# Patient Record
Sex: MALE | Race: BLACK OR AFRICAN AMERICAN | NOT HISPANIC OR LATINO | Employment: FULL TIME | URBAN - METROPOLITAN AREA
[De-identification: names, ages, dates, MRNs, and addresses within clinical notes are randomized per-mention and may not be internally consistent; named-entity substitution may affect disease eponyms.]

---

## 2017-02-01 ENCOUNTER — OFFICE VISIT (OUTPATIENT)
Dept: FAMILY MEDICINE | Facility: CLINIC | Age: 37
End: 2017-02-01
Payer: COMMERCIAL

## 2017-02-01 VITALS
OXYGEN SATURATION: 98 % | WEIGHT: 243.38 LBS | SYSTOLIC BLOOD PRESSURE: 140 MMHG | HEART RATE: 73 BPM | TEMPERATURE: 99 F | HEIGHT: 72 IN | BODY MASS INDEX: 32.97 KG/M2 | DIASTOLIC BLOOD PRESSURE: 90 MMHG

## 2017-02-01 DIAGNOSIS — M25.561 CHRONIC PAIN OF RIGHT KNEE: Primary | ICD-10-CM

## 2017-02-01 DIAGNOSIS — G89.29 CHRONIC PAIN OF RIGHT KNEE: Primary | ICD-10-CM

## 2017-02-01 DIAGNOSIS — J31.0 CHRONIC RHINITIS: ICD-10-CM

## 2017-02-01 PROCEDURE — 99999 PR PBB SHADOW E&M-EST. PATIENT-LVL III: CPT | Mod: PBBFAC,,, | Performed by: FAMILY MEDICINE

## 2017-02-01 PROCEDURE — 99214 OFFICE O/P EST MOD 30 MIN: CPT | Mod: S$GLB,,, | Performed by: FAMILY MEDICINE

## 2017-02-01 RX ORDER — BUTALBITAL, ACETAMINOPHEN AND CAFFEINE 50; 325; 40 MG/1; MG/1; MG/1
1 TABLET ORAL EVERY 6 HOURS PRN
Qty: 30 TABLET | Refills: 5 | Status: SHIPPED | OUTPATIENT
Start: 2017-02-01

## 2017-02-01 RX ORDER — NAPROXEN 500 MG/1
500 TABLET ORAL 2 TIMES DAILY WITH MEALS
Qty: 60 TABLET | Refills: 0 | Status: SHIPPED | OUTPATIENT
Start: 2017-02-01 | End: 2017-03-17 | Stop reason: SDUPTHER

## 2017-02-01 RX ORDER — TRAMADOL HYDROCHLORIDE 50 MG/1
50 TABLET ORAL EVERY 12 HOURS PRN
Qty: 60 TABLET | Refills: 0 | Status: SHIPPED | OUTPATIENT
Start: 2017-02-01 | End: 2017-03-17 | Stop reason: SDUPTHER

## 2017-02-01 RX ORDER — FLUTICASONE PROPIONATE 50 MCG
1 SPRAY, SUSPENSION (ML) NASAL DAILY
Qty: 16 G | Refills: 11 | Status: SHIPPED | OUTPATIENT
Start: 2017-02-01 | End: 2017-02-07 | Stop reason: SDUPTHER

## 2017-02-01 RX ORDER — CYCLOBENZAPRINE HCL 5 MG
TABLET ORAL
Refills: 1 | COMMUNITY
Start: 2016-12-27

## 2017-02-01 NOTE — PROGRESS NOTES
Subjective:       Patient ID: Leticia Cade is a 36 y.o. male.    Chief Complaint: Follow Up/ Knee Pain, Swelling and Discuss Pain Meds    HPI Comments: Subjective:     Leticia Cade is a 36 y.o. male who presents for evaluation of symptoms of a URI. Symptoms include achiness, coryza, cough described as productive and productive of green sputum and low grade fever. Onset of symptoms was 4 days ago, and has been gradually improving since that time. Treatment to date: theraflu.      Subjective:    Leticia Cade is a 36 y.o. male who presents with knee pain involving the right knee. Onset was gradual, starting about 9 months ago. Inciting event: injured while playing basketball. He dove ont he ground and his knee hit the wood floor. Current symptoms include: crepitus sensation, giving out, popping sensation, stiffness and swelling. Pain is aggravated by any weight bearing, lateral movements, walking and step climbing. Patient has had no prior knee problems. Evaluation to date: none. Treatment to date: meloxicam, diclofenac,ibuprofen, and steroid packs which are not very effective.      Review of Systems    Objective:       Vitals:    02/01/17 1137   BP: (!) 140/90   Pulse: 73   Temp: 98.6 °F (37 °C)   TempSrc: Oral   SpO2: 98%   Weight: 110.4 kg (243 lb 6.2 oz)   Height: 6' (1.829 m)       Physical Exam   Constitutional: He is oriented to person, place, and time. He appears well-developed and well-nourished. No distress.   HENT:   Head: Normocephalic and atraumatic.   Right Ear: External ear normal.   Left Ear: External ear normal.   Mouth/Throat: Oropharynx is clear and moist. No oropharyngeal exudate.   Neck: Normal range of motion. Neck supple.   Cardiovascular: Normal rate, regular rhythm and normal heart sounds.  Exam reveals no gallop and no friction rub.    No murmur heard.  Pulmonary/Chest: Effort normal and breath sounds normal. No respiratory distress. He has no wheezes. He has no  rales. He exhibits no tenderness.   Musculoskeletal:        Right knee: He exhibits decreased range of motion and swelling. He exhibits no effusion, no erythema, normal alignment and no LCL laxity. Tenderness found.        Legs:  Lymphadenopathy:     He has cervical adenopathy.   Neurological: He is alert and oriented to person, place, and time.   Skin: He is not diaphoretic.       Assessment:       1. Chronic pain of right knee    2. Chronic rhinitis        Plan:       Leticia Gonzales was seen today for follow up/ knee pain, swelling and discuss pain meds.    Diagnoses and all orders for this visit:    Chronic pain of right knee  -     X-Ray Knee 3 View Right; Future  -     MRI Lower Extremity Joint W WO Contrast Right; Future  -     Ambulatory referral to Orthopedics  -     naproxen (NAPROSYN) 500 MG tablet; Take 1 tablet (500 mg total) by mouth 2 (two) times daily with meals.  -     tramadol (ULTRAM) 50 mg tablet; Take 1 tablet (50 mg total) by mouth every 12 (twelve) hours as needed for Pain.  Given naproxen and ultRam for pain. Checking with xray and MRI FOR TENDINITIS AND/OR MENISCAL TEARS    Chronic rhinitis  -     fluticasone (FLONASE) 50 mcg/actuation nasal spray; 1 spray by Each Nare route once daily.    Other orders  -     butalbital-acetaminophen-caffeine -40 mg (FIORICET, ESGIC) -40 mg per tablet; Take 1 tablet by mouth every 6 (six) hours as needed.         `

## 2017-02-02 ENCOUNTER — HOSPITAL ENCOUNTER (OUTPATIENT)
Dept: RADIOLOGY | Facility: HOSPITAL | Age: 37
Discharge: HOME | End: 2017-02-02
Attending: FAMILY MEDICINE
Payer: COMMERCIAL

## 2017-02-02 DIAGNOSIS — G89.29 CHRONIC PAIN OF RIGHT KNEE: ICD-10-CM

## 2017-02-02 DIAGNOSIS — M25.561 CHRONIC PAIN OF RIGHT KNEE: ICD-10-CM

## 2017-02-02 PROCEDURE — 73721 MRI JNT OF LWR EXTRE W/O DYE: CPT | Mod: 26,RT,, | Performed by: RADIOLOGY

## 2017-02-02 PROCEDURE — 73721 MRI JNT OF LWR EXTRE W/O DYE: CPT | Mod: TC,RT

## 2017-02-02 PROCEDURE — 73562 X-RAY EXAM OF KNEE 3: CPT | Mod: TC,RT

## 2017-02-02 PROCEDURE — 73562 X-RAY EXAM OF KNEE 3: CPT | Mod: 26,RT,, | Performed by: RADIOLOGY

## 2017-02-03 ENCOUNTER — TELEPHONE (OUTPATIENT)
Dept: FAMILY MEDICINE | Facility: CLINIC | Age: 37
End: 2017-02-03

## 2017-02-03 NOTE — TELEPHONE ENCOUNTER
LET HIM KNOW HE HAS SOME INFLAMMATION OF HIS PATELLAR TENDON THAT I SUSPECTED, BUT NO COMPLETE TEARS. HE ALSO HAS DAMAGE TO THE CARTILAGE ALONG THE INNER PART OF HIS KNEE. IT DOESN'T LOOKS SURGICAL, BUT HE LIKELY WILL BENEFIT FROM INJECTIONS WITH THE ORTHOPEDIST. THAT REFERRAL HAS BEEN PLACED.

## 2017-02-03 NOTE — TELEPHONE ENCOUNTER
----- Message from Melissa Garza sent at 2/3/2017  2:28 PM CST -----  Contact: self  [phone]  Pt is requesting to speak to you regarding his pain and that he could not see the othro doctor because of the price. Pls call pt [phone]. Thanks......Maritza

## 2017-02-03 NOTE — TELEPHONE ENCOUNTER
Patient stated that issue with deductible was clarified with patient's insurance.  Stated he was able to see provider.

## 2017-02-07 DIAGNOSIS — J31.0 CHRONIC RHINITIS: ICD-10-CM

## 2017-02-07 RX ORDER — FLUTICASONE PROPIONATE 50 MCG
1 SPRAY, SUSPENSION (ML) NASAL DAILY
Qty: 16 G | Refills: 5 | Status: SHIPPED | OUTPATIENT
Start: 2017-02-07

## 2017-03-17 DIAGNOSIS — G89.29 CHRONIC PAIN OF RIGHT KNEE: ICD-10-CM

## 2017-03-17 DIAGNOSIS — M25.561 CHRONIC PAIN OF RIGHT KNEE: ICD-10-CM

## 2017-03-17 RX ORDER — TRAMADOL HYDROCHLORIDE 50 MG/1
50 TABLET ORAL EVERY 12 HOURS PRN
Qty: 60 TABLET | Refills: 0 | Status: SHIPPED | OUTPATIENT
Start: 2017-03-17

## 2017-03-17 RX ORDER — NAPROXEN 500 MG/1
500 TABLET ORAL 2 TIMES DAILY WITH MEALS
Qty: 60 TABLET | Refills: 0 | Status: SHIPPED | OUTPATIENT
Start: 2017-03-17

## 2017-03-17 NOTE — TELEPHONE ENCOUNTER
Returned call. Patient stated that his Orthopedic doctor cancelled his appt for today and r/s him for 3/24/17. Patient wanted to know if he could get a temporary supply of meds until appt. Advised that I would send message to MD to authorize refills. Informed that I would call back to let him know if meds were sent. Verbalized understanding.  Send to [facility] in [place], [place] Pharmacy was changed in chart.

## 2017-03-17 NOTE — TELEPHONE ENCOUNTER
----- Message from Angelica Mcqueen sent at 3/17/2017  8:15 AM CDT -----  Patient is requesting a 1 week supply pn below medications:      naproxen (NAPROSYN) 500 MG tablet   tramadol (ULTRAM) 50 mg tablet     His orthopedic canceled his appt this week. Patient believes he re-injured his leg. Please call patient at [phone] Thank you!

## 2025-05-19 ENCOUNTER — OFFICE VISIT (OUTPATIENT)
Dept: URGENT CARE | Facility: CLINIC | Age: 45
End: 2025-05-19
Payer: COMMERCIAL

## 2025-05-19 VITALS
OXYGEN SATURATION: 98 % | RESPIRATION RATE: 20 BRPM | HEIGHT: 72 IN | BODY MASS INDEX: 32.85 KG/M2 | HEART RATE: 99 BPM | WEIGHT: 242.5 LBS | TEMPERATURE: 100 F | SYSTOLIC BLOOD PRESSURE: 126 MMHG | DIASTOLIC BLOOD PRESSURE: 88 MMHG

## 2025-05-19 DIAGNOSIS — R49.0 HOARSENESS OF VOICE: ICD-10-CM

## 2025-05-19 DIAGNOSIS — R05.9 COUGH, UNSPECIFIED TYPE: ICD-10-CM

## 2025-05-19 DIAGNOSIS — J32.9 BACTERIAL SINUSITIS: Primary | ICD-10-CM

## 2025-05-19 DIAGNOSIS — B96.89 BACTERIAL SINUSITIS: Primary | ICD-10-CM

## 2025-05-19 LAB
CTP QC/QA: YES
CTP QC/QA: YES
POC MOLECULAR INFLUENZA A AGN: NEGATIVE
POC MOLECULAR INFLUENZA B AGN: NEGATIVE
SARS-COV+SARS-COV-2 AG RESP QL IA.RAPID: NEGATIVE

## 2025-05-19 PROCEDURE — 87811 SARS-COV-2 COVID19 W/OPTIC: CPT | Mod: QW,S$GLB,, | Performed by: NURSE PRACTITIONER

## 2025-05-19 PROCEDURE — 99204 OFFICE O/P NEW MOD 45 MIN: CPT | Mod: S$GLB,,, | Performed by: NURSE PRACTITIONER

## 2025-05-19 PROCEDURE — 87502 INFLUENZA DNA AMP PROBE: CPT | Mod: QW,S$GLB,, | Performed by: NURSE PRACTITIONER

## 2025-05-19 RX ORDER — AMOXICILLIN AND CLAVULANATE POTASSIUM 875; 125 MG/1; MG/1
1 TABLET, FILM COATED ORAL 2 TIMES DAILY
Qty: 14 TABLET | Refills: 0 | Status: SHIPPED | OUTPATIENT
Start: 2025-05-19 | End: 2025-05-26

## 2025-05-19 NOTE — LETTER
May 19, 2025      [facility] [address]  [place] [address]  Phone: [phone]  Fax: [phone]       Patient: Leticia Cade   YOB: 1980  Date of Visit: 05/19/2025    To Whom It May Concern:    Cheryl Cade  was at [facility] on 05/19/2025. The patient may return to work/school on 5/21/2025 with no restrictions. If you have any questions or concerns, or if I can be of further assistance, please do not hesitate to contact me.    Sincerely,      Madeline Win, NP

## 2025-05-19 NOTE — PATIENT INSTRUCTIONS
Prescriptions sent to pharmacy:  Augmentin- antibiotic- take twice a day for 7 days. Take with food. May take a probiotic to help with GI side effects.    Other recommendations:  Oral antihistamine (Claritin, Zyrtec, Allegra,or Xyzal) for post nasal drip and runny nose  Steroid nasal spray (Flonase) for runny nose and sinus congestion  Cough expectorant (Mucinex) to break up mucus in your chest. Take with a full glass of water    Please drink plenty of fluids.  Please get plenty of rest.  Nasal irrigation with a saline spray or Netti Pot like device per their directions is also recommended.  If you  smoke, please stop smoking.    If not allergic, take Tylenol (Acetaminophen) 650 mg to  1 g every 6 hours as needed  and/or Motrin (Ibuprofen) 600 to 800 mg every 6 hours as needed for fever or pain.    Please remember that you have received care at an urgent care today. Urgent cares are not emergency rooms and are not equipped to handle life threatening emergencies and cannot rule in or out certain medical conditions and you may be released before all of your medical problems are known or treated.     Please arrange follow up with your primary care physician or speciality clinic within 2-5 days if your signs and symptoms have not resolved or worsen.     Patient can call our Referral Hotline at [phone] to make an appointment.      Please return here or go to the Emergency Department for any concerns or worsening of condition.

## 2025-05-19 NOTE — PROGRESS NOTES
Subjective:      Patient ID: Leticia Cade is a 44 y.o. male.    Vitals:  height is 6' (1.829 m) and weight is 110 kg (242 lb 8.1 oz). His oral temperature is 100 °F (37.8 °C). His blood pressure is 126/88 and his pulse is 99. His respiration is 20 and oxygen saturation is 98%.     Chief Complaint: Sinus Problem    Pt present with chills, loss of taste, congestion, diarrhea. Sx started 4 days ago. Tx include OTC  at home.     Sinus Problem  This is a new problem. The current episode started in the past 7 days. The problem is unchanged. There has been no fever. Associated symptoms include congestion, coughing, headaches, sinus pressure and sneezing. Past treatments include oral decongestants. The treatment provided no relief.     HENT:  Positive for congestion and sinus pressure.    Respiratory:  Positive for cough.    Allergic/Immunologic: Positive for sneezing.   Neurological:  Positive for headaches.      Objective:     Physical Exam   Constitutional: He is oriented to person, place, and time. He appears well-developed. He is cooperative.  Non-toxic appearance. He does not appear ill. No distress.   HENT:   Head: Normocephalic and atraumatic.   Ears:   Right Ear: Hearing, external ear and ear canal normal. A middle ear effusion is present.   Left Ear: Hearing, external ear and ear canal normal. A middle ear effusion is present.   Nose: Rhinorrhea and congestion present. No mucosal edema or nasal deformity. No epistaxis. Right sinus exhibits maxillary sinus tenderness and frontal sinus tenderness. Left sinus exhibits maxillary sinus tenderness and frontal sinus tenderness.   Mouth/Throat: Uvula is midline, oropharynx is clear and moist and mucous membranes are normal. No trismus in the jaw. Normal dentition. No uvula swelling. No oropharyngeal exudate, posterior oropharyngeal edema or posterior oropharyngeal erythema.   Eyes: Conjunctivae and lids are normal. No scleral icterus.   Neck: Trachea normal and  phonation normal. Neck supple. No edema present. No erythema present. No neck rigidity present.   Cardiovascular: Normal rate, regular rhythm, normal heart sounds and normal pulses.   Pulmonary/Chest: Effort normal and breath sounds normal. No stridor. No respiratory distress. He has no decreased breath sounds. He has no wheezes. He has no rhonchi. He has no rales. He exhibits no tenderness.   Abdominal: Normal appearance.   Musculoskeletal: Normal range of motion.         General: No deformity. Normal range of motion.   Neurological: He is alert and oriented to person, place, and time. He exhibits normal muscle tone. Coordination normal.   Skin: Skin is warm, dry, intact, not diaphoretic and not pale.   Psychiatric: His speech is normal and behavior is normal. Judgment and thought content normal.   Nursing note and vitals reviewed.      Assessment:     1. Bacterial sinusitis    2. Cough, unspecified type    3. Hoarseness of voice      Results for orders placed or performed in visit on 05/19/25   POCT Influenza A/B MOLECULAR    Collection Time: 05/19/25  4:55 PM   Result Value Ref Range    POC Molecular Influenza A Ag Negative Negative    POC Molecular Influenza B Ag Negative Negative     Acceptable Yes    SARS Coronavirus 2 Antigen, POCT Manual Read    Collection Time: 05/19/25  5:06 PM   Result Value Ref Range    SARS Coronavirus 2 Antigen Negative Negative, Presumptive Negative     Acceptable Yes          Plan:       Bacterial sinusitis  -     amoxicillin-clavulanate 875-125mg (AUGMENTIN) 875-125 mg per tablet; Take 1 tablet by mouth 2 (two) times daily. for 7 days  Dispense: 14 tablet; Refill: 0    Cough, unspecified type  -     POCT Influenza A/B MOLECULAR  -     SARS Coronavirus 2 Antigen, POCT Manual Read    Hoarseness of voice  -     Ambulatory referral/consult to ENT        Patient Instructions   Prescriptions sent to pharmacy:  Augmentin- antibiotic- take twice a day for 7  days. Take with food. May take a probiotic to help with GI side effects.    Other recommendations:  Oral antihistamine (Claritin, Zyrtec, Allegra,or Xyzal) for post nasal drip and runny nose  Steroid nasal spray (Flonase) for runny nose and sinus congestion  Cough expectorant (Mucinex) to break up mucus in your chest. Take with a full glass of water    Please drink plenty of fluids.  Please get plenty of rest.  Nasal irrigation with a saline spray or Netti Pot like device per their directions is also recommended.  If you  smoke, please stop smoking.    If not allergic, take Tylenol (Acetaminophen) 650 mg to  1 g every 6 hours as needed  and/or Motrin (Ibuprofen) 600 to 800 mg every 6 hours as needed for fever or pain.    Please remember that you have received care at an urgent care today. Urgent cares are not emergency rooms and are not equipped to handle life threatening emergencies and cannot rule in or out certain medical conditions and you may be released before all of your medical problems are known or treated.     Please arrange follow up with your primary care physician or speciality clinic within 2-5 days if your signs and symptoms have not resolved or worsen.     Patient can call our Referral Hotline at [phone] to make an appointment.      Please return here or go to the Emergency Department for any concerns or worsening of condition.

## 2025-05-22 ENCOUNTER — OFFICE VISIT (OUTPATIENT)
Dept: OTOLARYNGOLOGY | Facility: CLINIC | Age: 45
End: 2025-05-22
Payer: COMMERCIAL

## 2025-05-22 VITALS
WEIGHT: 218.25 LBS | DIASTOLIC BLOOD PRESSURE: 94 MMHG | BODY MASS INDEX: 29.6 KG/M2 | HEART RATE: 97 BPM | SYSTOLIC BLOOD PRESSURE: 137 MMHG

## 2025-05-22 DIAGNOSIS — J30.89 NON-SEASONAL ALLERGIC RHINITIS, UNSPECIFIED TRIGGER: Primary | ICD-10-CM

## 2025-05-22 DIAGNOSIS — R49.0 HOARSENESS, PERSISTENT: ICD-10-CM

## 2025-05-22 DIAGNOSIS — J38.3 LESION OF TRUE VOCAL CORD: ICD-10-CM

## 2025-05-22 PROCEDURE — 3075F SYST BP GE 130 - 139MM HG: CPT | Mod: CPTII,S$GLB,, | Performed by: NURSE PRACTITIONER

## 2025-05-22 PROCEDURE — 31575 DIAGNOSTIC LARYNGOSCOPY: CPT | Mod: S$GLB,,, | Performed by: NURSE PRACTITIONER

## 2025-05-22 PROCEDURE — 3008F BODY MASS INDEX DOCD: CPT | Mod: CPTII,S$GLB,, | Performed by: NURSE PRACTITIONER

## 2025-05-22 PROCEDURE — 3080F DIAST BP >= 90 MM HG: CPT | Mod: CPTII,S$GLB,, | Performed by: NURSE PRACTITIONER

## 2025-05-22 PROCEDURE — 99204 OFFICE O/P NEW MOD 45 MIN: CPT | Mod: 25,S$GLB,, | Performed by: NURSE PRACTITIONER

## 2025-05-22 PROCEDURE — 1159F MED LIST DOCD IN RCRD: CPT | Mod: CPTII,S$GLB,, | Performed by: NURSE PRACTITIONER

## 2025-05-22 RX ORDER — AZELASTINE 1 MG/ML
1 SPRAY, METERED NASAL 2 TIMES DAILY
Qty: 30 ML | Refills: 3 | Status: SHIPPED | OUTPATIENT
Start: 2025-05-22

## 2025-05-22 RX ORDER — FLUTICASONE PROPIONATE 50 MCG
1 SPRAY, SUSPENSION (ML) NASAL 2 TIMES DAILY
Qty: 18.2 ML | Refills: 3 | Status: SHIPPED | OUTPATIENT
Start: 2025-05-22

## 2025-05-22 NOTE — PATIENT INSTRUCTIONS
"Information and instructions from your visit with me today:    Start using the following medication nasal sprays:   Fluticasone spray:    This medication is a steroid spray. It stays within the nose and does not have absorption into the body that leads to side effects that one has with oral steroid medication. Fluticasone nasal spray is the same as the Flonase brand nasal spray. Discuss with your pharmacist if the price is lower over the counter or with a prescription ( this varies depending on insurance). The medication that is over the counter is the same as the prescription medication. Use this medication as instructed on the prescription, 1-2 sprays on each side of your nose twice daily.     Azelastine  spray:  This medication is an antihistamine used to treat nasal symptoms of allergy, which works specifically in the nose unlike antihistamine pills which have more of an effect on the whole body. Use this medication as instructed on the prescription, 1 spray on each side of your nose twice daily.     Additional instructions for medication sprays  Place the tip of the medication bottle in your nose and aim slightly up and out on each side to get medication high and deep into your nose and sinuses, and not have it all deposit in the very front of your nose. Aim the tip of the nozzle towards the outer corner of your eye . You can imagine aiming towards the back of your eyeball on each side for this, as opposed to straight back to the center of your nose and head.     You need to use this medication every day regardless of symptoms, as it takes time ( a few weeks) to work and get the benefits. It does not work on an "as needed" basis like taking a decongestant. If your symptoms only occur in a particular season, then the medication can be used seasonally instead of year long. For seasonal symptoms, you should start using the spray twice daily a month before when you normally have symptoms ( for example, if symptoms " start in August, should start at the end of June).     Start nasal irrigations with saline solution- you can either use a rinse or a mist spray:    SALINE SINUS RINSE ([employer] Med brand): You should do a full bottle, half on one side of your nose and half on the other, 1-2 times per day (or more if able to, you cannot do this too much). Follow the instructions on the box: mix the salt packet with clean water (bottle, previously boiled, distilled, etc -- not tap water) to the line on the bottle to make the irrigation.         NASAL SALINE SPRAY ( simply saline and arm and hammer are examples) There are several different brands found in the cold and flu aisle of the pharmacy. You can use any brand of saline spray - this will deliver the saline by a gentle mist ( if you have difficulty or discomfort with nasal rinse/ a lot of fluid in the nose, this will be more comfortable).       Always rinse your nose with saline prior to using medication sprays and wait a couple of hours before using again. You can use the saline throughout the day to help with stuffy nose or dry nose.      It was nice meeting you today, and I look forward to helping you feel better soon. Please don't hesitate to call if you have any other questions or concerns, or if I can be of any assistance in the meantime.          REZA Scott, FNP-C  Otorhinolaryngology

## 2025-05-22 NOTE — PROGRESS NOTES
OTOLARYNGOLOGY CLINIC NOTE  Date:  05/26/2025     Chief complaint:  Chief Complaint   Patient presents with    Hoarse     Present for a couple of years, more present during there morning.      History of Present Illness  Leticia Cade is a 44 y.o. male  presenting today for a new evaluation and treatment of hoarseness for the past 3 years.  Pt reports his voice has been raspy for the past 3 years.  Pt reports he rarely hears his normal voice.  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  Pt has nasal congestion, post nasal drip, rhinitis.  Pt denies anosmia, sore throat, and acid reflux.      Past Medical History  Past Medical History:   Diagnosis Date    Allergy       Past Surgical History  Past Surgical History:   Procedure Laterality Date    KNEE ARTHROSCOPY W/ ACL RECONSTRUCTION      left      Medications  Medications Ordered Prior to Encounter[1]    Review of Systems  Review of Systems   Constitutional: Negative.    HENT: Negative.     Eyes: Negative.    Respiratory: Negative.     Cardiovascular: Negative.    Gastrointestinal: Negative.    Skin: Negative.       Social History   reports that he has quit smoking. His smoking use included cigarettes. He started smoking about 15 years ago. He does not have any smokeless tobacco history on file. He reports current alcohol use.     Family History  Family History   Problem Relation Name Age of Onset    Cancer Mother          breast    Cancer Father          colon    Stroke Brother      Hypertension Brother      Alzheimer's disease Maternal Grandmother        Physical Exam   Vitals:    05/22/25 0823   BP: (!) 137/94   Pulse: 97    Body mass index is 29.6 kg/m².  Weight: 99 kg (218 lb 4.1 oz)       GENERAL: no acute distress.  HEAD: normocephalic.   EYES: lids and lashes normal. No scleral icterus  EARS: external ear without lesion, normal pinna shape and position.  External auditory canal with normal cerumen, tympanic  membrane fully visible, no perforation , no retraction. No middle ear effusion.   NOSE: external nose without significant bony abnormality  ORAL CAVITY/OROPHARYNX: tongue midline and mobile. Symmetric palate rise.  NECK: trachea midline.   LYMPH NODES:No cervical lymphadenopathy.  RESPIRATORY: no stridor, no stertor. Voice normal. Respirations nonlabored.  NEURO: alert, responds to questions appropriately.   PSYCH:mood appropriate    PROCEDURE NOTE  NAME OF PROCEDURE: Flexible Laryngoscopy, diagnostic  INDICATIONS: gag reflex precludes mirror exam,  dysphonia /  FINDINGS: Lesion of true vocal fold    Consent: After procedure was explained in detail and all questions answered, verbal consent was obtained for performing flexible laryngoscopy.  Anesthesia: topical 4% lidocaine and neosynephrine  Procedure: With patient in seated position, the scope was inserted into the bilateral nasal passageway and advanced atraumatically into the nasopharynx to examine the following structures:  Nasal cavity: Turbinates with mild hypertrophy. No purulent drainage.   Nasopharynx: no mass or lesion noted in nasopharynx.   Oropharynx: base of tongue without  mass or ulceration. Lingual tonsils normal in appearance  Hypopharynx: posterior pharyngeal wall without mass or lesion. No pooling of secretions. Pyriform sinuses visible without mass or lesion  Larynx: epiglottis normal without lesion. False vocal folds without edema/erythema/lesion. True vocal folds mobile and with lesion on left. Mild interarytenoid edema no erythema . Postcricoid region with mild edema no lesion   Subglottis: visualized portion of subglottis normal in appearance  After examination performed, the scope was removed atraumatically . The patient tolerated the procedure well. Photodocumentation obtained with representative images below, all images and/or videos uploaded in media section of epic.      Media Information    File Link            Imaging:  The patient  does not have any pertinent and/or recent imaging of the head and neck.     Assessment  1. Non-seasonal allergic rhinitis, unspecified trigger  - fluticasone propionate (FLONASE) 50 mcg/actuation nasal spray; 1 spray (50 mcg total) by Each Nostril route 2 (two) times daily.  Dispense: 18.2 mL; Refill: 3  - azelastine (ASTELIN) 137 mcg (0.1 %) nasal spray; 1 spray (137 mcg total) by Nasal route 2 (two) times daily.  Dispense: 30 mL; Refill: 3    2. Lesion of true vocal cord    3. Hoarseness, persistent     Plan:  Allergic Rhinitis:  Discussed about pathophysiology of allergic disease and sinus disease. We discussed about treatment options for this including but not limited to medications and potential surgeries as well as when surgery is indicated.  - I recommend dual nasal spray therapy as combo of steroid and antihistamine nasal spray has been shown in evidence-based studies to be better than either alone.   -Discussed medication administration technique (point toward outer corner of eye and not towards nasal septum) and use nasal saline prior to medication sprays.   -We discussed importance of saline use prior to medication sprays to help sprays work better and to clean the nose.   -Counseled on risk of bleeding, risk of increased intraocular pressure/cataract with long term use.   -Discussed how to increase and decrease nasal spray regimen based on symptoms and that sprays can be used on prn basis but work best when used daily and take about 2-3 weeks to get to max level. If patient using sprays on a prn basis and symptoms not controlled should go back to daily /bid use  -discussed as well as gave patient physical documentation with photos about the saline and other medication sprays (paperwork summarized discussion topics).   Lesion of true vocal cord Hoarseness:  Pt advised of laryngoscopy results which showed a lesion on left true vocal fold.  Pt referred to Dr. Alonso for evaluation and treatment.     Discussed plan of care with patient in detail and all questions answered. Patient reported understanding of plan of care.          [1]   Current Outpatient Medications on File Prior to Visit   Medication Sig Dispense Refill    amoxicillin-clavulanate 875-125mg (AUGMENTIN) 875-125 mg per tablet Take 1 tablet by mouth 2 (two) times daily. for 7 days 14 tablet 0    butalbital-acetaminophen-caffeine -40 mg (FIORICET, ESGIC) -40 mg per tablet Take 1 tablet by mouth every 6 (six) hours as needed. 30 tablet 5    cyclobenzaprine (FLEXERIL) 5 MG tablet TK 1 T PO TID  1    diclofenac (VOLTAREN) 75 MG EC tablet Take 1 tablet (75 mg total) by mouth 2 (two) times daily as needed (with food). 60 tablet 2    levocetirizine (XYZAL) 5 MG tablet take 1 tablet by mouth every evening 30 tablet 5    naproxen (NAPROSYN) 500 MG tablet Take 1 tablet (500 mg total) by mouth 2 (two) times daily with meals. 60 tablet 0    tramadol (ULTRAM) 50 mg tablet Take 1 tablet (50 mg total) by mouth every 12 (twelve) hours as needed for Pain. 60 tablet 0     No current facility-administered medications on file prior to visit.

## 2025-05-29 ENCOUNTER — ANESTHESIA EVENT (OUTPATIENT)
Dept: SURGERY | Facility: HOSPITAL | Age: 45
End: 2025-05-29
Payer: COMMERCIAL

## 2025-05-29 ENCOUNTER — DOCUMENTATION ONLY (OUTPATIENT)
Dept: OTOLARYNGOLOGY | Facility: CLINIC | Age: 45
End: 2025-05-29

## 2025-05-29 ENCOUNTER — OFFICE VISIT (OUTPATIENT)
Dept: OTOLARYNGOLOGY | Facility: CLINIC | Age: 45
End: 2025-05-29
Payer: COMMERCIAL

## 2025-05-29 VITALS
WEIGHT: 218.25 LBS | BODY MASS INDEX: 29.56 KG/M2 | HEIGHT: 72 IN | DIASTOLIC BLOOD PRESSURE: 91 MMHG | SYSTOLIC BLOOD PRESSURE: 142 MMHG

## 2025-05-29 DIAGNOSIS — J38.3 LESION OF TRUE VOCAL CORD: Primary | ICD-10-CM

## 2025-05-29 DIAGNOSIS — R49.0 DYSPHONIA: ICD-10-CM

## 2025-05-29 PROCEDURE — 4010F ACE/ARB THERAPY RXD/TAKEN: CPT | Mod: CPTII,S$GLB,, | Performed by: OTOLARYNGOLOGY

## 2025-05-29 PROCEDURE — 3008F BODY MASS INDEX DOCD: CPT | Mod: CPTII,S$GLB,, | Performed by: OTOLARYNGOLOGY

## 2025-05-29 PROCEDURE — 3080F DIAST BP >= 90 MM HG: CPT | Mod: CPTII,S$GLB,, | Performed by: OTOLARYNGOLOGY

## 2025-05-29 PROCEDURE — 3077F SYST BP >= 140 MM HG: CPT | Mod: CPTII,S$GLB,, | Performed by: OTOLARYNGOLOGY

## 2025-05-29 PROCEDURE — 1159F MED LIST DOCD IN RCRD: CPT | Mod: CPTII,S$GLB,, | Performed by: OTOLARYNGOLOGY

## 2025-05-29 PROCEDURE — 99214 OFFICE O/P EST MOD 30 MIN: CPT | Mod: S$GLB,,, | Performed by: OTOLARYNGOLOGY

## 2025-05-29 RX ORDER — LOSARTAN POTASSIUM 100 MG/1
TABLET ORAL
COMMUNITY
Start: 2025-05-22

## 2025-05-29 RX ORDER — AMLODIPINE BESYLATE 5 MG/1
5 TABLET ORAL
COMMUNITY

## 2025-05-29 RX ORDER — ESCITALOPRAM OXALATE 20 MG/1
TABLET ORAL
COMMUNITY

## 2025-05-29 RX ORDER — GABAPENTIN 300 MG/1
CAPSULE ORAL
COMMUNITY

## 2025-05-29 RX ORDER — SODIUM CHLORIDE 0.9 % (FLUSH) 0.9 %
10 SYRINGE (ML) INJECTION
OUTPATIENT
Start: 2025-05-29

## 2025-05-29 NOTE — H&P (VIEW-ONLY)
OTOLARYNGOLOGY CLINIC NOTE  Date:  05/29/2025     Chief complaint:  Chief Complaint   Patient presents with    vocal cord lesion       History of Present Illness  Leticia Cade is a 44 y.o. male  presenting today for a followup.    Has had dysphonia for a couple of years and has been worsening. Would go in and out at first but now hoarse all the time. 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  Was at [facility]     Past Medical History  Past Medical History:   Diagnosis Date    Allergy         Past Surgical History  Past Surgical History:   Procedure Laterality Date    KNEE ARTHROSCOPY W/ ACL RECONSTRUCTION      left        Medications  Medications Ordered Prior to Encounter[1]    Review of Systems  Review of Systems   Constitutional:  Positive for chills, fever and malaise/fatigue.   HENT:  Positive for ear pain and sore throat.    Respiratory:  Positive for cough.    Cardiovascular: Negative.    Gastrointestinal: Negative.    Genitourinary: Negative.    Skin: Negative.    Neurological: Negative.    Psychiatric/Behavioral:  The patient is nervous/anxious.         Answers submitted by the patient for this visit:  Review of Symptoms Questionnaire  (Submitted on 5/29/2025)  Sinus infection(s)?: Yes  postnasal drip: Yes  Voice Change?: Yes  eye itching: Yes  Snoring?: Yes  Sleep Apnea?: Yes  Seasonal Allergies?: Yes  decreased concentration: Yes  Social History   reports that he has quit smoking. His smoking use included cigarettes. He started smoking about 15 years ago. He has never used smokeless tobacco. He  reports current alcohol use.     Family History  Family History   Problem Relation Name Age of Onset    Cancer Mother          breast    Cancer Father          colon    Stroke Brother      Hypertension Brother      Alzheimer's disease Maternal Grandmother      Sister and dad had heart attack ,grandparents had strokes    Physical Exam   Vitals:    05/29/25 0854   BP: (!) 142/91    Body mass index is 29.6 kg/m².  Weight: 99 kg (218 lb 4.1 oz)   Height: 6' (182.9 cm)     GENERAL: no acute distress.  HEAD: normocephalic.   EYES: No scleral icterus  EARS: external ear without lesion, normal pinna shape and position.    NOSE: external nose without significant bony abnormality  ORAL CAVITY/OROPHARYNX: tongue mobile.   NECK: trachea midline.   LYMPH NODES:No cervical lymphadenopathy.  RESPIRATORY: no stridor, no stertor. Voice raspy. Respirations nonlabored.  NEURO: alert, responds to questions appropriately.    PSYCH:mood appropriate    Photo of prior scope for reference    Imaging:  The patient does not have any new imaging of the head and neck since last visit.     Labs:  CBC      BMP      COAGS        Assessment  1. Lesion of true vocal cord  - Vital signs; Standing  - Diet NPO; Standing  - Case Request Operating Room: LARYNGOSCOPY, DIRECT, WITH BIOPSY IF INDICATED  - Full code; Standing  - Place in Outpatient; Standing  - Place sequential compression device; Standing  - CBC auto differential; Standing  - Comprehensive Metabolic Panel; Standing  - EKG 12-lead; Standing  - X-Ray Chest PA And Lateral; Standing    2. Dysphonia       Plan:  Discussed plan of care with patient in detail and all questions answered. Patient reported understanding of plan of care. I gave the patient the opportunity to ask questions and patient confirmed all questions answered to satisfaction.     Scope exam video from NP verrett reviewed- left mid fol  Would recommend talking in normal voice and using microphone if has to do sermon post op and  try to minimize talking. May need full voice rest  Discussed ddx- precancer, cancer, nodule - I suspect nodule but with unilateral, history of smoking and location ( not fully anterior but mid-anterior) recommend removal for eval for non benign pathology ; appearance on flex scope not suggestive of malignancy but we discussed that can be difficult to tell bye eye and sometimes cancer can look like a nodule   Discussed expectations for voice after procedure- may worsen , may need additional tx  May need ppi postop as well  Will need slp postop to prevent recurrence as if nodule , discussed that typically arise as compensatory muscle issue and muscles need retraining   Discussed potential restrictions postop related to voice use   Recommend operative direct laryngoscopy with microscopy and biopsy for further evaluation. We discussed risks/benefits/alternatives/indications including but not limited to , gingival injury, dental injury, tongue laceration, tongue numbness, bleeding, pain , nondiagnostic biopsy and possible need for further treatment. The patient elected to proceed and surgery was scheduled for 6-17-25  F/u postop             [1]   Current Outpatient Medications on File Prior to Visit   Medication Sig Dispense Refill    azelastine (ASTELIN) 137 mcg (0.1 %) nasal spray 1 spray (137 mcg total) by Nasal route 2 (two) times daily. 30 mL 3    butalbital-acetaminophen-caffeine -40 mg (FIORICET, ESGIC) -40 mg per tablet Take 1 tablet by mouth every 6 (six) hours as needed. 30 tablet 5    cyclobenzaprine (FLEXERIL) 5 MG tablet TK 1 T PO TID  1    diclofenac (VOLTAREN) 75 MG EC tablet Take 1 tablet (75 mg total) by mouth 2 (two) times daily as needed (with food). 60 tablet 2    fluticasone propionate (FLONASE) 50 mcg/actuation nasal spray 1 spray (50 mcg total) by Each Nostril route 2 (two) times daily. 18.2 mL 3    levocetirizine (XYZAL) 5 MG tablet take 1 tablet by mouth every evening 30 tablet 5     losartan (COZAAR) 100 MG tablet Take by mouth.      naproxen (NAPROSYN) 500 MG tablet Take 1 tablet (500 mg total) by mouth 2 (two) times daily with meals. 60 tablet 0    tramadol (ULTRAM) 50 mg tablet Take 1 tablet (50 mg total) by mouth every 12 (twelve) hours as needed for Pain. 60 tablet 0    amLODIPine (NORVASC) 5 MG tablet Take 5 mg by mouth.      EScitalopram oxalate (LEXAPRO) 20 MG tablet 1 tablet Orally Once a day for 30 days      gabapentin (NEURONTIN) 300 MG capsule Take by mouth.       No current facility-administered medications on file prior to visit.

## 2025-05-29 NOTE — PROGRESS NOTES
OTOLARYNGOLOGY CLINIC NOTE  Date:  05/29/2025     Chief complaint:  Chief Complaint   Patient presents with    vocal cord lesion       History of Present Illness  Leticia Cade is a 44 y.o. male  presenting today for a followup.    Has had dysphonia for a couple of years and has been worsening. Would go in and out at first but now hoarse all the time. 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  Was at [facility]     Past Medical History  Past Medical History:   Diagnosis Date    Allergy         Past Surgical History  Past Surgical History:   Procedure Laterality Date    KNEE ARTHROSCOPY W/ ACL RECONSTRUCTION      left        Medications  Medications Ordered Prior to Encounter[1]    Review of Systems  Review of Systems   Constitutional:  Positive for chills, fever and malaise/fatigue.   HENT:  Positive for ear pain and sore throat.    Respiratory:  Positive for cough.    Cardiovascular: Negative.    Gastrointestinal: Negative.    Genitourinary: Negative.    Skin: Negative.    Neurological: Negative.    Psychiatric/Behavioral:  The patient is nervous/anxious.         Answers submitted by the patient for this visit:  Review of Symptoms Questionnaire  (Submitted on 5/29/2025)  Sinus infection(s)?: Yes  postnasal drip: Yes  Voice Change?: Yes  eye itching: Yes  Snoring?: Yes  Sleep Apnea?: Yes  Seasonal Allergies?: Yes  decreased concentration: Yes  Social History   reports that he has quit smoking. His smoking use included cigarettes. He started smoking about 15 years ago. He has never used smokeless tobacco. He  reports current alcohol use.     Family History  Family History   Problem Relation Name Age of Onset    Cancer Mother          breast    Cancer Father          colon    Stroke Brother      Hypertension Brother      Alzheimer's disease Maternal Grandmother      Sister and dad had heart attack ,grandparents had strokes    Physical Exam   Vitals:    05/29/25 0854   BP: (!) 142/91    Body mass index is 29.6 kg/m².  Weight: 99 kg (218 lb 4.1 oz)   Height: 6' (182.9 cm)     GENERAL: no acute distress.  HEAD: normocephalic.   EYES: No scleral icterus  EARS: external ear without lesion, normal pinna shape and position.    NOSE: external nose without significant bony abnormality  ORAL CAVITY/OROPHARYNX: tongue mobile.   NECK: trachea midline.   LYMPH NODES:No cervical lymphadenopathy.  RESPIRATORY: no stridor, no stertor. Voice raspy. Respirations nonlabored.  NEURO: alert, responds to questions appropriately.    PSYCH:mood appropriate    Photo of prior scope for reference    Imaging:  The patient does not have any new imaging of the head and neck since last visit.     Labs:  CBC      BMP      COAGS        Assessment  1. Lesion of true vocal cord  - Vital signs; Standing  - Diet NPO; Standing  - Case Request Operating Room: LARYNGOSCOPY, DIRECT, WITH BIOPSY IF INDICATED  - Full code; Standing  - Place in Outpatient; Standing  - Place sequential compression device; Standing  - CBC auto differential; Standing  - Comprehensive Metabolic Panel; Standing  - EKG 12-lead; Standing  - X-Ray Chest PA And Lateral; Standing    2. Dysphonia       Plan:  Discussed plan of care with patient in detail and all questions answered. Patient reported understanding of plan of care. I gave the patient the opportunity to ask questions and patient confirmed all questions answered to satisfaction.     Scope exam video from NP verrett reviewed- left mid fol  Would recommend talking in normal voice and using microphone if has to do sermon post op and  try to minimize talking. May need full voice rest  Discussed ddx- precancer, cancer, nodule - I suspect nodule but with unilateral, history of smoking and location ( not fully anterior but mid-anterior) recommend removal for eval for non benign pathology ; appearance on flex scope not suggestive of malignancy but we discussed that can be difficult to tell bye eye and sometimes cancer can look like a nodule   Discussed expectations for voice after procedure- may worsen , may need additional tx  May need ppi postop as well  Will need slp postop to prevent recurrence as if nodule , discussed that typically arise as compensatory muscle issue and muscles need retraining   Discussed potential restrictions postop related to voice use   Recommend operative direct laryngoscopy with microscopy and biopsy for further evaluation. We discussed risks/benefits/alternatives/indications including but not limited to , gingival injury, dental injury, tongue laceration, tongue numbness, bleeding, pain , nondiagnostic biopsy and possible need for further treatment. The patient elected to proceed and surgery was scheduled for 6-17-25  F/u postop             [1]   Current Outpatient Medications on File Prior to Visit   Medication Sig Dispense Refill    azelastine (ASTELIN) 137 mcg (0.1 %) nasal spray 1 spray (137 mcg total) by Nasal route 2 (two) times daily. 30 mL 3    butalbital-acetaminophen-caffeine -40 mg (FIORICET, ESGIC) -40 mg per tablet Take 1 tablet by mouth every 6 (six) hours as needed. 30 tablet 5    cyclobenzaprine (FLEXERIL) 5 MG tablet TK 1 T PO TID  1    diclofenac (VOLTAREN) 75 MG EC tablet Take 1 tablet (75 mg total) by mouth 2 (two) times daily as needed (with food). 60 tablet 2    fluticasone propionate (FLONASE) 50 mcg/actuation nasal spray 1 spray (50 mcg total) by Each Nostril route 2 (two) times daily. 18.2 mL 3    levocetirizine (XYZAL) 5 MG tablet take 1 tablet by mouth every evening 30 tablet 5     losartan (COZAAR) 100 MG tablet Take by mouth.      naproxen (NAPROSYN) 500 MG tablet Take 1 tablet (500 mg total) by mouth 2 (two) times daily with meals. 60 tablet 0    tramadol (ULTRAM) 50 mg tablet Take 1 tablet (50 mg total) by mouth every 12 (twelve) hours as needed for Pain. 60 tablet 0    amLODIPine (NORVASC) 5 MG tablet Take 5 mg by mouth.      EScitalopram oxalate (LEXAPRO) 20 MG tablet 1 tablet Orally Once a day for 30 days      gabapentin (NEURONTIN) 300 MG capsule Take by mouth.       No current facility-administered medications on file prior to visit.

## 2025-06-03 ENCOUNTER — HOSPITAL ENCOUNTER (OUTPATIENT)
Dept: RADIOLOGY | Facility: HOSPITAL | Age: 45
Discharge: HOME | End: 2025-06-03
Attending: OTOLARYNGOLOGY
Payer: COMMERCIAL

## 2025-06-03 ENCOUNTER — HOSPITAL ENCOUNTER (OUTPATIENT)
Dept: PREADMISSION TESTING | Facility: HOSPITAL | Age: 45
Discharge: HOME | End: 2025-06-03
Attending: OTOLARYNGOLOGY
Payer: COMMERCIAL

## 2025-06-03 VITALS
SYSTOLIC BLOOD PRESSURE: 132 MMHG | DIASTOLIC BLOOD PRESSURE: 88 MMHG | HEIGHT: 72 IN | BODY MASS INDEX: 30.09 KG/M2 | RESPIRATION RATE: 16 BRPM | WEIGHT: 222.13 LBS | OXYGEN SATURATION: 99 % | HEART RATE: 100 BPM | TEMPERATURE: 99 F

## 2025-06-03 DIAGNOSIS — Z01.818 PREOP TESTING: ICD-10-CM

## 2025-06-03 DIAGNOSIS — J38.3 LESION OF TRUE VOCAL CORD: ICD-10-CM

## 2025-06-03 LAB
ABSOLUTE EOSINOPHIL (OHS): 0.09 K/UL
ABSOLUTE MONOCYTE (OHS): 0.36 K/UL (ref 0.3–1)
ABSOLUTE NEUTROPHIL COUNT (OHS): 4.07 K/UL (ref 1.8–7.7)
ALBUMIN SERPL BCP-MCNC: 3.5 G/DL (ref 3.5–5.2)
ALP SERPL-CCNC: 106 UNIT/L (ref 40–150)
ALT SERPL W/O P-5'-P-CCNC: 35 UNIT/L (ref 10–44)
ANION GAP (OHS): 9 MMOL/L (ref 8–16)
AST SERPL-CCNC: 20 UNIT/L (ref 11–45)
BASOPHILS # BLD AUTO: 0.05 K/UL
BASOPHILS NFR BLD AUTO: 0.7 %
BILIRUB SERPL-MCNC: 0.5 MG/DL (ref 0.1–1)
BUN SERPL-MCNC: 12 MG/DL (ref 6–20)
CALCIUM SERPL-MCNC: 8.9 MG/DL (ref 8.7–10.5)
CHLORIDE SERPL-SCNC: 109 MMOL/L (ref 95–110)
CO2 SERPL-SCNC: 25 MMOL/L (ref 23–29)
CREAT SERPL-MCNC: 0.8 MG/DL (ref 0.5–1.4)
ERYTHROCYTE [DISTWIDTH] IN BLOOD BY AUTOMATED COUNT: 14.3 % (ref 11.5–14.5)
GFR SERPLBLD CREATININE-BSD FMLA CKD-EPI: >60 ML/MIN/1.73/M2
GLUCOSE SERPL-MCNC: 82 MG/DL (ref 70–110)
HCT VFR BLD AUTO: 45.7 % (ref 40–54)
HGB BLD-MCNC: 14.3 GM/DL (ref 14–18)
IMM GRANULOCYTES # BLD AUTO: 0.02 K/UL (ref 0–0.04)
IMM GRANULOCYTES NFR BLD AUTO: 0.3 % (ref 0–0.5)
LYMPHOCYTES # BLD AUTO: 2.38 K/UL (ref 1–4.8)
MCH RBC QN AUTO: 26 PG (ref 27–31)
MCHC RBC AUTO-ENTMCNC: 31.3 G/DL (ref 32–36)
MCV RBC AUTO: 83 FL (ref 82–98)
NUCLEATED RBC (/100WBC) (OHS): 0 /100 WBC
PLATELET # BLD AUTO: 352 K/UL (ref 150–450)
PMV BLD AUTO: 9.1 FL (ref 9.2–12.9)
POTASSIUM SERPL-SCNC: 4 MMOL/L (ref 3.5–5.1)
PROT SERPL-MCNC: 7.5 GM/DL (ref 6–8.4)
RBC # BLD AUTO: 5.49 M/UL (ref 4.6–6.2)
RELATIVE EOSINOPHIL (OHS): 1.3 %
RELATIVE LYMPHOCYTE (OHS): 34.1 % (ref 18–48)
RELATIVE MONOCYTE (OHS): 5.2 % (ref 4–15)
RELATIVE NEUTROPHIL (OHS): 58.4 % (ref 38–73)
SODIUM SERPL-SCNC: 143 MMOL/L (ref 136–145)
WBC # BLD AUTO: 6.97 K/UL (ref 3.9–12.7)

## 2025-06-03 PROCEDURE — 93010 ELECTROCARDIOGRAM REPORT: CPT | Mod: ,,, | Performed by: INTERNAL MEDICINE

## 2025-06-03 PROCEDURE — 93005 ELECTROCARDIOGRAM TRACING: CPT

## 2025-06-03 PROCEDURE — 84075 ASSAY ALKALINE PHOSPHATASE: CPT | Performed by: OTOLARYNGOLOGY

## 2025-06-03 PROCEDURE — 71046 X-RAY EXAM CHEST 2 VIEWS: CPT | Mod: TC,FY

## 2025-06-03 PROCEDURE — 71046 X-RAY EXAM CHEST 2 VIEWS: CPT | Mod: 26,,, | Performed by: RADIOLOGY

## 2025-06-03 PROCEDURE — 85025 COMPLETE CBC W/AUTO DIFF WBC: CPT | Performed by: OTOLARYNGOLOGY

## 2025-06-03 RX ORDER — BREXPIPRAZOLE 2 MG/1
2 TABLET ORAL DAILY
COMMUNITY

## 2025-06-03 RX ORDER — LISDEXAMFETAMINE DIMESYLATE 50 MG/1
50 CAPSULE ORAL EVERY MORNING
COMMUNITY

## 2025-06-03 NOTE — ANESTHESIA PREPROCEDURE EVALUATION
06/03/2025  Leticia Cade is a 44 y.o., male scheduled for LARYNGOSCOPY, DIRECT, WITH BIOPSY IF INDICATED on 6/17/2025.      Past Medical History:   Diagnosis Date    Allergy     Depression     Generalized anxiety disorder     Hypertension     Sleep apnea          Past Surgical History:   Procedure Laterality Date    KNEE ARTHROSCOPY W/ ACL RECONSTRUCTION      left           Pre-op Assessment    I have reviewed the Patient Summary Reports.     I have reviewed the Nursing Notes. I have reviewed the NPO Status.   I have reviewed the Medications.     Review of Systems  Anesthesia Hx:  No problems with previous Anesthesia             Denies Family Hx of Anesthesia complications.    Denies Personal Hx of Anesthesia complications.                    Social:  Social Alcohol Use, Recreational Drugs marijuana      Hematology/Oncology:  Hematology Normal   Oncology Normal                                   EENT/Dental:   Lesion of vocal cord          Cardiovascular:  Exercise tolerance: good   Hypertension                                          Pulmonary:        Sleep Apnea                Renal/:  Renal/ Normal                 Hepatic/GI:  Hepatic/GI Normal                    Musculoskeletal:  Musculoskeletal Normal                Neurological:       Denies Headaches.                                 Endocrine:        Obesity / BMI > 30  Dermatological:  Skin Normal    Psych:   anxiety depression                   Anesthesia Plan  Type of Anesthesia, risks & benefits discussed:    Anesthesia Type: Gen ETT  Intra-op Monitoring Plan: Standard ASA Monitors  Post Op Pain Control Plan: multimodal analgesia  Induction:  IV  Airway Plan: Video and Direct, Post-Induction  Informed Consent: Informed consent signed with the Patient and all parties understand the risks and agree with anesthesia plan.  All questions  answered.   ASA Score: 2  Day of Surgery Review of History & Physical: H&P Update referred to the surgeon/provider.    Ready For Surgery From Anesthesia Perspective.     .

## 2025-06-05 LAB
OHS QRS DURATION: 80 MS
OHS QTC CALCULATION: 442 MS

## 2025-06-16 ENCOUNTER — TELEPHONE (OUTPATIENT)
Dept: SURGERY | Facility: HOSPITAL | Age: 45
End: 2025-06-16
Payer: COMMERCIAL

## 2025-06-17 ENCOUNTER — HOSPITAL ENCOUNTER (OUTPATIENT)
Facility: HOSPITAL | Age: 45
Discharge: HOME | End: 2025-06-17
Attending: OTOLARYNGOLOGY | Admitting: OTOLARYNGOLOGY
Payer: COMMERCIAL

## 2025-06-17 ENCOUNTER — ANESTHESIA (OUTPATIENT)
Dept: SURGERY | Facility: HOSPITAL | Age: 45
End: 2025-06-17
Payer: COMMERCIAL

## 2025-06-17 DIAGNOSIS — J38.3 LESION OF TRUE VOCAL CORD: ICD-10-CM

## 2025-06-17 DIAGNOSIS — J38.3 LESION OF VOCAL FOLD: ICD-10-CM

## 2025-06-17 PROCEDURE — 71000033 HC RECOVERY, INTIAL HOUR: Performed by: OTOLARYNGOLOGY

## 2025-06-17 PROCEDURE — 25000003 PHARM REV CODE 250: Performed by: OTOLARYNGOLOGY

## 2025-06-17 PROCEDURE — 25000003 PHARM REV CODE 250: Performed by: STUDENT IN AN ORGANIZED HEALTH CARE EDUCATION/TRAINING PROGRAM

## 2025-06-17 PROCEDURE — 37000008 HC ANESTHESIA 1ST 15 MINUTES: Performed by: OTOLARYNGOLOGY

## 2025-06-17 PROCEDURE — 37000009 HC ANESTHESIA EA ADD 15 MINS: Performed by: OTOLARYNGOLOGY

## 2025-06-17 PROCEDURE — 71000039 HC RECOVERY, EACH ADD'L HOUR: Performed by: OTOLARYNGOLOGY

## 2025-06-17 PROCEDURE — 31541 LARYNSCOP W/TUMR EXC + SCOPE: CPT | Mod: ,,, | Performed by: OTOLARYNGOLOGY

## 2025-06-17 PROCEDURE — 36000708 HC OR TIME LEV III 1ST 15 MIN: Performed by: OTOLARYNGOLOGY

## 2025-06-17 PROCEDURE — 63600175 PHARM REV CODE 636 W HCPCS: Performed by: STUDENT IN AN ORGANIZED HEALTH CARE EDUCATION/TRAINING PROGRAM

## 2025-06-17 PROCEDURE — 63600175 PHARM REV CODE 636 W HCPCS: Performed by: OTOLARYNGOLOGY

## 2025-06-17 PROCEDURE — 36000709 HC OR TIME LEV III EA ADD 15 MIN: Performed by: OTOLARYNGOLOGY

## 2025-06-17 PROCEDURE — 71000015 HC POSTOP RECOV 1ST HR: Performed by: OTOLARYNGOLOGY

## 2025-06-17 PROCEDURE — 88305 TISSUE EXAM BY PATHOLOGIST: CPT | Mod: TC | Performed by: OTOLARYNGOLOGY

## 2025-06-17 PROCEDURE — 63600175 PHARM REV CODE 636 W HCPCS: Performed by: ANESTHESIOLOGY

## 2025-06-17 RX ORDER — HYDROCODONE BITARTRATE AND ACETAMINOPHEN 5; 325 MG/1; MG/1
1 TABLET ORAL EVERY 6 HOURS PRN
Refills: 0 | Status: CANCELLED | OUTPATIENT
Start: 2025-06-17

## 2025-06-17 RX ORDER — ONDANSETRON HYDROCHLORIDE 2 MG/ML
INJECTION, SOLUTION INTRAVENOUS
Status: DISCONTINUED | OUTPATIENT
Start: 2025-06-17 | End: 2025-06-17

## 2025-06-17 RX ORDER — ROCURONIUM BROMIDE 10 MG/ML
INJECTION, SOLUTION INTRAVENOUS
Status: DISCONTINUED | OUTPATIENT
Start: 2025-06-17 | End: 2025-06-17

## 2025-06-17 RX ORDER — SODIUM CHLORIDE, SODIUM LACTATE, POTASSIUM CHLORIDE, CALCIUM CHLORIDE 600; 310; 30; 20 MG/100ML; MG/100ML; MG/100ML; MG/100ML
INJECTION, SOLUTION INTRAVENOUS CONTINUOUS
Status: DISCONTINUED | OUTPATIENT
Start: 2025-06-17 | End: 2025-06-17 | Stop reason: HOSPADM

## 2025-06-17 RX ORDER — OXYMETAZOLINE HCL 0.05 %
SPRAY, NON-AEROSOL (ML) NASAL
Status: DISCONTINUED | OUTPATIENT
Start: 2025-06-17 | End: 2025-06-17 | Stop reason: HOSPADM

## 2025-06-17 RX ORDER — HYDROMORPHONE HYDROCHLORIDE 2 MG/ML
0.2 INJECTION, SOLUTION INTRAMUSCULAR; INTRAVENOUS; SUBCUTANEOUS EVERY 5 MIN PRN
Status: DISCONTINUED | OUTPATIENT
Start: 2025-06-17 | End: 2025-06-17 | Stop reason: HOSPADM

## 2025-06-17 RX ORDER — LIDOCAINE HYDROCHLORIDE 10 MG/ML
1 INJECTION, SOLUTION EPIDURAL; INFILTRATION; INTRACAUDAL; PERINEURAL ONCE
Status: DISCONTINUED | OUTPATIENT
Start: 2025-06-17 | End: 2025-06-17 | Stop reason: HOSPADM

## 2025-06-17 RX ORDER — MEPERIDINE HYDROCHLORIDE 25 MG/ML
12.5 INJECTION INTRAMUSCULAR; INTRAVENOUS; SUBCUTANEOUS EVERY 10 MIN PRN
Status: DISCONTINUED | OUTPATIENT
Start: 2025-06-17 | End: 2025-06-17 | Stop reason: HOSPADM

## 2025-06-17 RX ORDER — HYDROCODONE BITARTRATE AND ACETAMINOPHEN 7.5; 325 MG/15ML; MG/15ML
15 SOLUTION ORAL EVERY 6 HOURS PRN
Qty: 200 ML | Refills: 0 | Status: SHIPPED | OUTPATIENT
Start: 2025-06-17

## 2025-06-17 RX ORDER — MIDAZOLAM HYDROCHLORIDE 1 MG/ML
INJECTION INTRAMUSCULAR; INTRAVENOUS
Status: DISCONTINUED | OUTPATIENT
Start: 2025-06-17 | End: 2025-06-17

## 2025-06-17 RX ORDER — DEXMEDETOMIDINE HYDROCHLORIDE 100 UG/ML
INJECTION, SOLUTION INTRAVENOUS
Status: DISCONTINUED | OUTPATIENT
Start: 2025-06-17 | End: 2025-06-17

## 2025-06-17 RX ORDER — OMEPRAZOLE 40 MG/1
40 CAPSULE, DELAYED RELEASE ORAL DAILY
Qty: 30 EACH | Refills: 11 | Status: SHIPPED | OUTPATIENT
Start: 2025-06-17 | End: 2026-06-17

## 2025-06-17 RX ORDER — DIPHENHYDRAMINE HYDROCHLORIDE 50 MG/ML
25 INJECTION, SOLUTION INTRAMUSCULAR; INTRAVENOUS EVERY 6 HOURS PRN
Status: DISCONTINUED | OUTPATIENT
Start: 2025-06-17 | End: 2025-06-17 | Stop reason: HOSPADM

## 2025-06-17 RX ORDER — LIDOCAINE HYDROCHLORIDE 20 MG/ML
10 SOLUTION OROPHARYNGEAL ONCE
Status: COMPLETED | OUTPATIENT
Start: 2025-06-17 | End: 2025-06-17

## 2025-06-17 RX ORDER — FENTANYL CITRATE 50 UG/ML
INJECTION, SOLUTION INTRAMUSCULAR; INTRAVENOUS
Status: DISCONTINUED | OUTPATIENT
Start: 2025-06-17 | End: 2025-06-17

## 2025-06-17 RX ORDER — HYDROCODONE BITARTRATE AND ACETAMINOPHEN 7.5; 325 MG/15ML; MG/15ML
15 SOLUTION ORAL EVERY 4 HOURS PRN
Refills: 0 | Status: DISCONTINUED | OUTPATIENT
Start: 2025-06-17 | End: 2025-06-17 | Stop reason: HOSPADM

## 2025-06-17 RX ORDER — ACETAMINOPHEN 500 MG
1000 TABLET ORAL
Status: COMPLETED | OUTPATIENT
Start: 2025-06-17 | End: 2025-06-17

## 2025-06-17 RX ORDER — HYDROCODONE BITARTRATE AND ACETAMINOPHEN 5; 325 MG/1; MG/1
1 TABLET ORAL EVERY 6 HOURS PRN
Qty: 10 TABLET | Refills: 0 | Status: CANCELLED | OUTPATIENT
Start: 2025-06-17

## 2025-06-17 RX ORDER — SODIUM CHLORIDE 0.9 % (FLUSH) 0.9 %
10 SYRINGE (ML) INJECTION
Status: DISCONTINUED | OUTPATIENT
Start: 2025-06-17 | End: 2025-06-17 | Stop reason: HOSPADM

## 2025-06-17 RX ORDER — LIDOCAINE HYDROCHLORIDE 20 MG/ML
INJECTION INTRAVENOUS
Status: DISCONTINUED | OUTPATIENT
Start: 2025-06-17 | End: 2025-06-17

## 2025-06-17 RX ORDER — PROPOFOL 10 MG/ML
VIAL (ML) INTRAVENOUS
Status: DISCONTINUED | OUTPATIENT
Start: 2025-06-17 | End: 2025-06-17

## 2025-06-17 RX ORDER — DEXAMETHASONE SODIUM PHOSPHATE 4 MG/ML
INJECTION, SOLUTION INTRA-ARTICULAR; INTRALESIONAL; INTRAMUSCULAR; INTRAVENOUS; SOFT TISSUE
Status: DISCONTINUED | OUTPATIENT
Start: 2025-06-17 | End: 2025-06-17

## 2025-06-17 RX ORDER — LIDOCAINE HYDROCHLORIDE AND EPINEPHRINE 10; 10 UG/ML; MG/ML
INJECTION, SOLUTION INFILTRATION; PERINEURAL
Status: DISCONTINUED | OUTPATIENT
Start: 2025-06-17 | End: 2025-06-17 | Stop reason: HOSPADM

## 2025-06-17 RX ADMIN — DEXMEDETOMIDINE HYDROCHLORIDE 4 MCG: 100 INJECTION, SOLUTION, CONCENTRATE INTRAVENOUS at 12:06

## 2025-06-17 RX ADMIN — DEXMEDETOMIDINE HYDROCHLORIDE 6 MCG: 100 INJECTION, SOLUTION, CONCENTRATE INTRAVENOUS at 11:06

## 2025-06-17 RX ADMIN — LIDOCAINE HYDROCHLORIDE 10 ML: 20 SOLUTION ORAL at 03:06

## 2025-06-17 RX ADMIN — ROCURONIUM BROMIDE 10 MG: 10 INJECTION INTRAVENOUS at 11:06

## 2025-06-17 RX ADMIN — LIDOCAINE HYDROCHLORIDE 60 MG: 20 INJECTION, SOLUTION INTRAVENOUS at 11:06

## 2025-06-17 RX ADMIN — HYDROMORPHONE HYDROCHLORIDE 0.2 MG: 2 INJECTION, SOLUTION INTRAMUSCULAR; INTRAVENOUS; SUBCUTANEOUS at 12:06

## 2025-06-17 RX ADMIN — HYDROMORPHONE HYDROCHLORIDE 0.2 MG: 2 INJECTION, SOLUTION INTRAMUSCULAR; INTRAVENOUS; SUBCUTANEOUS at 02:06

## 2025-06-17 RX ADMIN — PROPOFOL 200 MG: 10 INJECTION, EMULSION INTRAVENOUS at 11:06

## 2025-06-17 RX ADMIN — MIDAZOLAM HYDROCHLORIDE 2 MG: 1 INJECTION INTRAMUSCULAR; INTRAVENOUS at 11:06

## 2025-06-17 RX ADMIN — ROCURONIUM BROMIDE 20 MG: 10 INJECTION INTRAVENOUS at 11:06

## 2025-06-17 RX ADMIN — ACETAMINOPHEN 1000 MG: 500 TABLET ORAL at 08:06

## 2025-06-17 RX ADMIN — FENTANYL CITRATE 100 MCG: 50 INJECTION, SOLUTION INTRAMUSCULAR; INTRAVENOUS at 11:06

## 2025-06-17 RX ADMIN — ROCURONIUM BROMIDE 50 MG: 10 INJECTION INTRAVENOUS at 11:06

## 2025-06-17 RX ADMIN — DEXMEDETOMIDINE HYDROCHLORIDE 2 MCG: 100 INJECTION, SOLUTION, CONCENTRATE INTRAVENOUS at 12:06

## 2025-06-17 RX ADMIN — HYDROMORPHONE HYDROCHLORIDE 0.2 MG: 2 INJECTION, SOLUTION INTRAMUSCULAR; INTRAVENOUS; SUBCUTANEOUS at 01:06

## 2025-06-17 RX ADMIN — SODIUM CHLORIDE, SODIUM LACTATE, POTASSIUM CHLORIDE, AND CALCIUM CHLORIDE: .6; .31; .03; .02 INJECTION, SOLUTION INTRAVENOUS at 11:06

## 2025-06-17 RX ADMIN — SUGAMMADEX 200 MG: 100 INJECTION, SOLUTION INTRAVENOUS at 12:06

## 2025-06-17 RX ADMIN — SODIUM CHLORIDE, POTASSIUM CHLORIDE, SODIUM LACTATE AND CALCIUM CHLORIDE: 600; 310; 30; 20 INJECTION, SOLUTION INTRAVENOUS at 08:06

## 2025-06-17 RX ADMIN — ONDANSETRON 4 MG: 2 INJECTION, SOLUTION INTRAMUSCULAR; INTRAVENOUS at 12:06

## 2025-06-17 RX ADMIN — DEXAMETHASONE SODIUM PHOSPHATE 4 MG: 4 INJECTION, SOLUTION INTRAMUSCULAR; INTRAVENOUS at 12:06

## 2025-06-17 RX ADMIN — DEXAMETHASONE SODIUM PHOSPHATE 8 MG: 4 INJECTION, SOLUTION INTRAMUSCULAR; INTRAVENOUS at 11:06

## 2025-06-17 NOTE — OP NOTE
[facility] - Surgery  Surgery Department  Operative Note    SUMMARY     Date of Procedure: 6/17/2025     Procedure: Procedure(s) (LRB):  LARYNGOSCOPY, MICRODIRECT, WITH BIOPSY     Surgeons and Role:     * Geovanna Alonso MD - Primary    Assisting Surgeon: None    Pre-Operative Diagnosis: Lesion of true vocal cord [J38.3]    Post-Operative Diagnosis: Post-Op Diagnosis Codes:     * Lesion of true vocal cord [J38.3]    Anesthesia: General    Operative Findings (including complications, if any):  pedunculated nodule at midfold of left vocal fold. No other mass or lesion  Very anterior larynx and tight jaw opening requiring some pressure to suspend - small tongue laceration resulted.          Operative report in detail: The patient was identified in the holding area per routine. He was taken to the operating room and placed supine on the operating table. The patient was intubated transorally by the anesthesiology service, and an adequate level of general endotracheal anesthesia was achieved. The head of the patient's bed was rotated 90 degrees away from anesthesia. His eyes were protected with tape, and a maxillary tooth guard  was placed. The patient was draped in the standard fashion for laryngoscopy, and a timeout was performed and the correct patient and procedure were identified.    The dedo laryngoscope was inserted in the patient's oral cavity and advanced to visualize the posterior oropharynx, hypopharynx, and endolarynx, with the above findings noted.  Visualization was optimized with the 0 degree telescope. Photodocumentation was obtained.  The laryngoscope was then seated in the endolarynx and suspended from the bay stand with a lewy arm. The telescope was then inserted back into the laryngoscope for optimal view of concerning lesion. Next the nodule was carefully grasped with a side grasper forcep under visualization with the telescope. The telescope was passed to the assistant to hold during procedure.  Using the grasping forceps to tent the nodule laterally, this allowed for visualization of appropriate plane of dissection . Dissection was performed using a microscissor while grasping the nodule carefully with the side grasper forceps as previously mentioned while under endoscopic guidance. The nodule was carefully dissected circumferentially with care to avoid going into deeper plane of tissue. Once the nodule was removed, it was passed off of the field and placed in a specimen cup to be sent for pathologic evaluation. An afrin soaked pledget was then placed over the wound bed for hemostasis. After adequate time for hemostasis , the pledget was removed and the wound bed examined. There was excellent hemostasis.The scope and tooth guard were removed as this was the conclusion of the procedure.     The patient was handed back over to anesthesia and extubated without complication.     Significant Surgical Tasks Conducted by the Assistant(s), if Applicable: none    Estimated Blood Loss: 5 cc           Implants: * No implants in log *    Specimens:   Specimen (24h ago, onward)       Start     Ordered    06/17/25 1228  Specimen to Pathology ENT  RELEASE UPON ORDERING        References:    Click here for ordering Quick Tip   Question:  Release to patient  Answer:  Immediate    06/17/25 1228                   ID Type Source Tests Collected by Time Destination   1 : left vocal fold nodule Tissue Vocal cord SPECIMEN TO PATHOLOGY Geovanna Alonso MD 6/17/2025 1220               Condition: Good    Disposition: PACU - hemodynamically stable.    Attestation: Op Note Attestation: I was physically present and scrubbed for the entire procedure.

## 2025-06-17 NOTE — DISCHARGE INSTRUCTIONS
If you have any problems or questions please call the following numbers :    Office hours:  Weekdays 8:00 am to 5:00 pm.  Please call [phone] and ask to speak with  my medical assistant, Amy, or my nurse Meredith, at the [facility].    After-hours & weekends:  Please call [phone] and ask to speak with the ENT resident doctor.    After surgery you may have some blood tinge in your saliva. This is normal and should improve over the next few days. If it does not, please call. You may have a but of soreness with swallowing or a raspy voice . This should improve over the next few days. If you have any discomfort with eating spicy food or citrus food, do not eat these items for 48-72 hours. You may not have an issue with spicy food or citrus.    Please call IMMMEDIATELY if you have:  Temperature of 101° F or greater  Coughing up large amount of blood clots   Pain not relieved by your prescribed pain medication    MEDICATIONS:  Most people need pain medication for after surgery. The best pain control comes from a combination of ACETAMINOPHEN (Tylenol) and IBUPROFEN (Motrin).  You can use any over-the-counter versions of acetaminophen and ibuprofen.  You should take 400 milligrams of ibuprofen every 6 hours and 650 milligrams of tylenol every 6 hours. These can be alternated so that you are taking something about every 3 hours while awake. If you need the narcotic pain medication, substitute this out for the acetaminophen. You should try to only use the narcotic medication if you are having trouble sleeping. This medication has a high addiction potential and we do not have a way to know who is at risk for getting addicted to narcotic pain medication. If you are not able to take ibuprofen ( you take eliquis, plavix or other blood thinner; have kidney disease or have been instructed by another physician to not take NSAID/ibuprofen) then do not take this medication.    Some people have problems  with bowel movements after surgery. If you have NOT had a bowel movement 3 days after surgery, go to your local pharmacy and purchase an over the counter stool softener such as COLACE. You can also ask the pharmacist for his or her recommendation. If you still do not have a bowel movement after starting the softener, please call the office.     ACTIVITY:  It is important to walk around often while at home to keep your blood circulating and prevent blood clots.    Do not whisper or shout. Try to minimize talking for the next 3-4 days but if you do talk , talk in a normal voice.     RESTRICTED ACTIVITIES AFTER SURGERY:  Do NOT operate a motor vehicle or any type of heavy machinery within 24 hours of taking pain medication.  DO NOT smoke or be around smokers. ACTIVITY LEVEL: If you have received sedation or an anesthetic, you may feel sleepy for several hours. Rest  until you are more awake. Slowly resume your normal activities. No heavy lifting, nothing more that 10-15 pounds until surgery site is healed.      NO driving, alcoholic beverages or signing legal documents for next 24 hours or while taking pain medication    BATHING: You may shower after your dressing is removed, but no tub baths, hot tubs, saunas or swimming until you see the doctor.    DIET: You may resume your home diet. If nausea is present, increase your diet gradually with fluids and bland foods. Drink extra water for the next 48 hours.     Medications: Pain medication should be taken only if needed and as directed. IF antibiotics are prescribed, the  medication should be taken until completed. You will be given an updated list of you medications.    CALL THE DOCTOR:  Fever over 101°F --any signs of infection including body aches, fever, chills, redness at surgery site   Severe pain that doesnt go away with medication.  Upset stomach and vomiting that is persistent  Problems urinating, unable to urinate or heavy bleeding (with or without  clots)      Fall Prevention  Millions of people fall every year and injure themselves. You may have had anesthesia or sedation which may increase your risk of falling. You may have health issues that put you at an increased risk of falling.     Here are ways to reduce your risk of falling.    Make your home safe by keeping walkways clear of objects you may trip over.  Use non-slip pads under rugs. Do not use area rugs or small throw rugs.  Use non-slip mats in bathtubs and showers.  Install handrails and lights on staircases.  Do not walk in poorly lit areas.  Do not stand on chairs or wobbly ladders.  Use caution when reaching overhead or looking upward. This position can cause a loss of balance.  Be sure your shoes fit properly, have non-slip bottoms and are in good condition.   Wear shoes both inside and out. Avoid going barefoot or wearing slippers.  Be cautious when going up and down stairs, curbs, and when walking on uneven sidewalks.  If your balance is poor, consider using a cane or walker.  If your fall was related to alcohol use, stop or limit alcohol intake.   If your fall was related to use of sleeping medicines, talk to your doctor about this. You may need to reduce your dosage at bedtime if you awaken during the night to go to the bathroom.    To reduce the need for nighttime bathroom trips:  Avoid drinking fluids for several hours before going to bed  Empty your bladder before going to bed  Men can keep a urinal at the bedside  Stay as active as you can. Balance, flexibility, strength, and endurance all come from exercise. They all play a role in preventing falls. Ask your healthcare provider which types of activity are right for you.  Get your vision checked on a regular basis.  If you have pets, know where they are before you stand up or walk so you don't trip over them.  Use night lights.

## 2025-06-17 NOTE — BRIEF OP NOTE
[facility] - Surgery  Brief Operative Note    Surgery Date: 6/17/2025     Surgeons and Role:     * Geovanna Alonso MD - Primary    Assisting Surgeon: None    Pre-op Diagnosis:  Lesion of true vocal cord [J38.3]    Post-op Diagnosis:  Post-Op Diagnosis Codes:     * Lesion of true vocal cord [J38.3]    Procedure(s) (LRB):  LARYNGOSCOPY, DIRECT, WITH BIOPSY IF INDICATED (N/A)    Anesthesia: General    Operative Findings: pedunculated nodule at midfold of left vocal fold. No other mass or lesion  Very anterior larynx and tight jaw opening requiring some pressure to suspend - small tongue laceration resulted.     Estimated Blood Loss: 5 cc         Specimens:   Specimen (24h ago, onward)       Start     Ordered    06/17/25 1228  Specimen to Pathology ENT  RELEASE UPON ORDERING        References:    Click here for ordering Quick Tip   Question:  Release to patient  Answer:  Immediate    06/17/25 1228                    ID Type Source Tests Collected by Time Destination   1 : left vocal fold nodule Tissue Vocal cord SPECIMEN TO PATHOLOGY Geovanna Alonso MD 6/17/2025 1220            Discharge Note    OUTCOME: Patient tolerated treatment/procedure well without complication and will be discharged when meets pacu criteria.    DISPOSITION: Home or Self Care    FINAL DIAGNOSIS: vocal fold nodule    FOLLOWUP: In clinic    DISCHARGE INSTRUCTIONS:  No discharge procedures on file.

## 2025-06-17 NOTE — TRANSFER OF CARE
Anesthesia Transfer of Care Note    Patient: Leticia Cade    Procedure(s) Performed: Procedure(s) (LRB):  LARYNGOSCOPY, DIRECT, WITH BIOPSY IF INDICATED (N/A)    Patient location: PACU    Anesthesia Type: general    Transport from OR: Transported from OR on room air with adequate spontaneous ventilation    Post pain: adequate analgesia    Post assessment: no apparent anesthetic complications and tolerated procedure well    Post vital signs: stable    Level of consciousness: awake and alert    Nausea/Vomiting: no nausea/vomiting    Complications: none    Transfer of care protocol was followed      Last vitals: Visit Vitals  BP (!) 167/103 (BP Location: Left arm, Patient Position: Lying)   Pulse 79   Temp 36.5 °C (97.7 °F) (Temporal)   Resp 20   Wt 100.7 kg (222 lb 1.6 oz)   SpO2 100%   BMI 30.12 kg/m²

## 2025-06-17 NOTE — PLAN OF CARE
Pre-op plan of care reviewed with patient and sister. Admit assessment complete. Questions encouraged and answered. Post-op education begun with pt. Pt ready to proceed.

## 2025-06-17 NOTE — ANESTHESIA PROCEDURE NOTES
Intubation    Date/Time: 6/17/2025 11:38 AM    Performed by: Caroline Gan CRNA  Authorized by: Kareem Wilcox II, MD    Intubation:     Induction:  Intravenous    Intubated:  Postinduction    Mask Ventilation:  Easy with oral airway    Attempted By:  Student    Method of Intubation:  Video laryngoscopy    Blade:  Lincoln 3    Laryngeal View Grade: Grade I - full view of cords      Difficult Airway Encountered?: No      Complications:  None    Airway Device:  Oral endotracheal tube    Airway Device Size:  6.0    Style/Cuff Inflation:  Cuffed (inflated to minimal occlusive pressure)    Tube secured:  23    Secured at:  The lips    Placement Verified By:  Capnometry    Complicating Factors:  None    Findings Post-Intubation:  BS equal bilateral and atraumatic/condition of teeth unchanged

## 2025-06-19 ENCOUNTER — NURSE TRIAGE (OUTPATIENT)
Dept: ADMINISTRATIVE | Facility: CLINIC | Age: 45
End: 2025-06-19
Payer: COMMERCIAL

## 2025-06-19 ENCOUNTER — PATIENT OUTREACH (OUTPATIENT)
Facility: OTHER | Age: 45
End: 2025-06-19
Payer: COMMERCIAL

## 2025-06-19 ENCOUNTER — OCHSNER VIRTUAL EMERGENCY DEPARTMENT (OUTPATIENT)
Facility: CLINIC | Age: 45
End: 2025-06-19
Payer: COMMERCIAL

## 2025-06-19 VITALS
WEIGHT: 222.13 LBS | DIASTOLIC BLOOD PRESSURE: 96 MMHG | HEART RATE: 78 BPM | SYSTOLIC BLOOD PRESSURE: 148 MMHG | OXYGEN SATURATION: 100 % | BODY MASS INDEX: 30.12 KG/M2 | TEMPERATURE: 98 F | RESPIRATION RATE: 18 BRPM

## 2025-06-19 DIAGNOSIS — R13.10 DYSPHAGIA, UNSPECIFIED TYPE: Primary | ICD-10-CM

## 2025-06-19 LAB
ESTROGEN SERPL-MCNC: NORMAL PG/ML
INSULIN SERPL-ACNC: NORMAL U[IU]/ML
LAB AP CLINICAL INFORMATION: NORMAL
LAB AP GROSS DESCRIPTION: NORMAL
LAB AP PERFORMING LOCATION(S): NORMAL
LAB AP REPORT FOOTNOTES: NORMAL

## 2025-06-19 NOTE — PLAN OF CARE-OVED
[facility] Emergency Department Plan of Care Note  Referral Source: Nurse On-Call                               Chief Complaint   Patient presents with    Post-op Problem   Pt s/p LARYNGOSCOPY, MICRODIRECT, WITH BIOPSY POD#2 states that his tongue has lacerations, is painful and is swollen. He reports that the swelling has worsened since the procedure, that he is having difficulty with swallowing, and his tongue is blocking liquids from being swallowed. He request swish, and spit medication. He denies SOB.       Recommendation: Specialist Referral         Specialty: ENT   Advised [place] to contact on call provider. If unable to contact provider, consider sending to ER at  for evaluation.                Encounter Diagnosis   Name Primary?    Dysphagia, unspecified type Yes

## 2025-06-19 NOTE — PROGRESS NOTES
"Patient spoke to [facility] RN on call nurse to report the following, "reports that he had surgery 6/17 with Dr. Alonso on his vocal cords. States that his tongue has lacerations, painful and swollen after procedure. States that he is having difficulty with swallowing, and tongue is blocking liquids from being swallowed. He request swish, and spit medication."    [facility] RN on call nurse consulted with Rowena MD on call, Dr. Wilian Chan, and disposition is Specialty. OOC nurse to contact on call specialist. Follow up scheduled on 06/21/2025 to outreach to assess for any additional needs/concerns.     "

## 2025-06-19 NOTE — TELEPHONE ENCOUNTER
Speaking with pt who reports that he had surgery 6/17. Vocal cords. States that his tongue has laceration, painful and swollen. States that he is having difficulty with swallowing, , and tongue is blocking liquids from being swallowed. States was advised by office to send message through portal to request swish, and spit. But can't use portal.     5:06 paged Dr. Mcqueen    5:25 paged Dr. Mcqueen    5:35 secure chat sent to Dr. Mcqueen    KELBY Dr. Chan Advised to call transfer center, to see if on call provider available [place] [facility] If not pt will need to be seen in ER    Spoke with transfer center, who states no provide on call for [facility]    Pt called back and informed, and advised to be seen in ER    Dr. Mcqueen sent secure chat back,and states will call pt back    Pt called back, and informed that provider will be giving him a call back.        Reason for Disposition   [1] SEVERE post-op pain (e.g., excruciating, pain scale 8-10) AND [2] not controlled with pain medications    Additional Information   Negative: Sounds like a life-threatening emergency to the triager   Negative: [1] Widespread rash AND [2] bright red, sunburn-like   Negative: [1] SEVERE headache (e.g., excruciating) AND [2] after spinal (epidural) anesthesia   Negative: [1] Vomiting AND [2] persists > 4 hours   Negative: [1] Vomiting AND [2] abdomen looks much more swollen than usual   Negative: [1] Drinking very little AND [2] dehydration suspected (e.g., no urine > 12 hours, very dry mouth, very lightheaded)   Negative: Patient sounds very sick or weak to the triager   Negative: Sounds like a serious complication to the triager   Negative: Fever > 100.4 F (38.0 C)    Protocols used: Post-Op Symptoms and [id number]

## 2025-06-20 ENCOUNTER — OFFICE VISIT (OUTPATIENT)
Dept: OTOLARYNGOLOGY | Facility: CLINIC | Age: 45
End: 2025-06-20
Payer: COMMERCIAL

## 2025-06-20 ENCOUNTER — TELEPHONE (OUTPATIENT)
Dept: OTOLARYNGOLOGY | Facility: CLINIC | Age: 45
End: 2025-06-20

## 2025-06-20 DIAGNOSIS — J38.1 VOCAL FOLD POLYP: Primary | ICD-10-CM

## 2025-06-20 DIAGNOSIS — R07.0 THROAT PAIN IN ADULT: ICD-10-CM

## 2025-06-20 DIAGNOSIS — S01.512A LACERATION OF TONGUE, INITIAL ENCOUNTER: ICD-10-CM

## 2025-06-20 PROCEDURE — 4010F ACE/ARB THERAPY RXD/TAKEN: CPT | Mod: CPTII,S$GLB,, | Performed by: OTOLARYNGOLOGY

## 2025-06-20 PROCEDURE — 99024 POSTOP FOLLOW-UP VISIT: CPT | Mod: S$GLB,,, | Performed by: OTOLARYNGOLOGY

## 2025-06-20 PROCEDURE — 3044F HG A1C LEVEL LT 7.0%: CPT | Mod: CPTII,S$GLB,, | Performed by: OTOLARYNGOLOGY

## 2025-06-20 PROCEDURE — 31575 DIAGNOSTIC LARYNGOSCOPY: CPT | Mod: S$GLB,,, | Performed by: OTOLARYNGOLOGY

## 2025-06-20 RX ORDER — METHYLPREDNISOLONE 4 MG/1
TABLET ORAL
Qty: 21 EACH | Refills: 0 | Status: SHIPPED | OUTPATIENT
Start: 2025-06-20 | End: 2025-07-10

## 2025-06-20 RX ORDER — CLINDAMYCIN HYDROCHLORIDE 300 MG/1
300 CAPSULE ORAL EVERY 8 HOURS
Qty: 15 CAPSULE | Refills: 0 | Status: SHIPPED | OUTPATIENT
Start: 2025-06-20 | End: 2025-06-25

## 2025-06-20 RX ORDER — LIDOCAINE HYDROCHLORIDE 20 MG/ML
SOLUTION OROPHARYNGEAL
Qty: 100 ML | Refills: 1 | Status: SHIPPED | OUTPATIENT
Start: 2025-06-20

## 2025-06-20 NOTE — TELEPHONE ENCOUNTER
Spoke with pt, states no meds called in yesterday, states someone was suppose to, states still having swelling to tongue, unable to eat or drink still, asked pt if able to come in today for visit, got pt scheduled for today at 345. Pt verb shannontanding

## 2025-06-20 NOTE — PROGRESS NOTES
ENT POSTOP CLINIC NOTE    SUBJECTIVE:   pt here for postop visit s/p DL removal of vocal fold polyp/biopsy on 6-19 -25. Had small cut on tongue secondary to laryngoscope and has been having swelling and discomfort related to that     OBJECTIVE:  General: no acute distress  Head: normocephalic  Oral cavity: healing tongue laceration with some edema and fibrinous debris  Neck:no edema  Respiratory: nonlabored , no stridor or stertor    PROCEDURE NOTE  NAME OF PROCEDURE: Flexible Laryngoscopy, diagnostic  INDICATIONS: gag reflex precludes mirror exam, throat pain in adult   FINDINGS: no mass or lesion, biopsy site healing well     Consent: After procedure was explained in detail and all questions answered, verbal consent was obtained for performing flexible laryngoscopy.  Anesthesia: topical 4% lidocaine and neosynephrine  Procedure: With patient in seated position, the scope was inserted into the bilateral nasal passageway and advanced atraumatically into the nasopharynx to examine the following structures:  Nasal cavity: Turbinates with mild hypertrophy. no middle meatal edema. No purulent drainage.   Nasopharynx: no mass or lesion noted in nasopharynx.   Oropharynx: base of tongue without  mass or ulceration. Lingual tonsils normal in appearance  Hypopharynx: posterior pharyngeal wall without mass or lesion. No pooling of secretions. Pyriform sinuses visible without mass or lesion  Larynx: epiglottis normal without lesion. False vocal folds without edema/erythema/lesion. True vocal folds mobile and without lesion. Mild interarytenoid edema no erythema . Postcricoid region with mild edema no lesion   Subglottis: visualized portion of subglottis normal in appearance    After examination performed, the scope was removed atraumatically . The patient tolerated the procedure well. Photodocumentation obtained with representative images below, all images and/or videos uploaded in media section of  epic.                                Pathology  Left vocal fold, nodule, biopsy:   Vocal cord polyp   Negative for atypia and malignancy    IMPRESSION:  s/p DL removal of vocal fold polyp/biopsy on 6-19 -25.    PLAN:  Abx, steroid and magic mouthwash given   Follow up next week , If doing better can cancel and set follow up for 3 months, sooner if issue

## 2025-06-24 ENCOUNTER — TELEPHONE (OUTPATIENT)
Dept: SPEECH THERAPY | Facility: HOSPITAL | Age: 45
End: 2025-06-24
Payer: COMMERCIAL

## 2025-06-25 ENCOUNTER — TELEPHONE (OUTPATIENT)
Dept: OTOLARYNGOLOGY | Facility: CLINIC | Age: 45
End: 2025-06-25
Payer: COMMERCIAL

## 2025-06-25 NOTE — TELEPHONE ENCOUNTER
Spoke with pt, states wanted to cancel post op appt today, has follow up in 3 month, states just wants to give tongue time to heal, will reach back out if any issues

## 2025-06-25 NOTE — PROGRESS NOTES
CARDIOVASCULAR PROGRESS NOTE    REASON FOR CONSULT:   Leticia Cade is a 44 y.o. male who presents for establishment of cardiology care.      CARDIOVASCULAR HISTORY:   Hypertension  Obesity class 1  KATHY on CPAP    HISTORY OF PRESENT ILLNESS:     He is coming to Cardiology Clinic for general well-being.  Recently he underwent laryngoscopy and resection of lesion on true vocal cord.  Surgery was done because of hoarseness of voice.  Polyp histology was negative for atypia and malignancy.    He is able to perform everyday life activities without issues.  No chest pain, shortness of breath or palpitations.  No orthopnea or PND.  No lower extremity edema.    He does not smoke cigarettes.  He drinks socially.    Family history significant for premature CAD.  Father had MI in 40s.  His sister has CHF and she also has a defibrillator in place.    PAST MEDICAL HISTORY:     Past Medical History:   Diagnosis Date    Allergy     Depression     Generalized anxiety disorder     Hypertension     Sleep apnea        PAST SURGICAL HISTORY:     Past Surgical History:   Procedure Laterality Date    KNEE ARTHROSCOPY W/ ACL RECONSTRUCTION      left    LARYNGOSCOPY, DIRECT, WITH BIOPSY IF INDICATED N/A 6/17/2025    Procedure: LARYNGOSCOPY, DIRECT, WITH BIOPSY IF INDICATED;  Surgeon: Geovanna Alonso MD;  [facility]: [facility];  Service: ENT;  Laterality: N/A;  RN PREOP 6/3/2025--LATEX ALLERGY    MOUTH SURGERY         ALLERGIES AND MEDICATION:     Review of patient's allergies indicates:   Allergen Reactions    Latex      Other Reaction(s): Unknown    Latex, natural rubber Hives    Nickel sutures [surgical stainless steel] Hives        Medication List            Accurate as of June 25, 2025 10:05 AM. If you have any questions, ask your nurse or doctor.                CONTINUE taking these medications      (MAGIC MOUTHWASH) 1:1:1 BENADRYL 12.5MG/5ML LIQ, ALUMINUM & MAGNESIUM, LIDOCAINE VISC 2%  Swish and spit 5 mLs every 4 (four)  hours as needed (mouth pain). for mouth sores     amLODIPine 5 MG tablet  Commonly known as: NORVASC     azelastine 137 mcg (0.1 %) nasal spray  Commonly known as: ASTELIN  1 spray (137 mcg total) by Nasal route 2 (two) times daily.     EScitalopram oxalate 20 MG tablet  Commonly known as: LEXAPRO     fluticasone propionate 50 mcg/actuation nasal spray  Commonly known as: FLONASE  1 spray (50 mcg total) by Each Nostril route 2 (two) times daily.     gabapentin 300 MG capsule  Commonly known as: NEURONTIN     HYDROcodone-acetaminophen 7.5-325 mg/15 mL oral liquid  Take 15 mLs by mouth every 6 (six) hours as needed for Pain.     levocetirizine 5 MG tablet  Commonly known as: XYZAL  take 1 tablet by mouth every evening     LIDOcaine viscous HCl 2% 2 % Soln  Commonly known as: XYLOCAINE  by Mucous Membrane route every 3 (three) hours.     lisdexamfetamine 50 MG capsule  Commonly known as: VYVANSE     losartan 100 MG tablet  Commonly known as: COZAAR     methylPREDNISolone 4 mg tablet  Commonly known as: MEDROL DOSEPACK  use as directed     omeprazole 40 MG capsule  Commonly known as: PRILOSEC  Take 1 capsule (40 mg total) by mouth once daily.     REXULTI 2 mg Tab  Generic drug: brexpiprazole              SOCIAL HISTORY:   Social History[1]    FAMILY HISTORY:     Family History   Problem Relation Name Age of Onset    Cancer Mother          breast    Cancer Father          colon    Stroke Brother      Hypertension Brother      Alzheimer's disease Maternal Grandmother         REVIEW OF SYSTEMS:   ROS    Constitution: Negative for chills, fever, weight gain and weight loss.   Eyes: Negative for blurry vision, visual changes    Cardiovascular: Negative for chest pain. Negative for claudication, dyspnea on exertion, leg swelling, orthopnea, palpitations, paroxysmal nocturnal dyspnea.   Respiratory: Negative for shortness of breath. Negative for cough.    Endocrine: Negative for heat or cold intolerance   "  Hematologic/Lymphatic: Negative for easy bruising or bleeding    Skin: Negative for color change and rash.   Musculoskeletal: Negative for neck pain, arthralgias, myalgias    Gastrointestinal: Negative for abdominal pain, nausea, vomiting, diarrhea  Neurological: Negative for dizziness, light-headedness and loss of balance.   Psychiatric/Behavioral: Negative for altered mental status.    PHYSICAL EXAM:   There were no vitals filed for this visit. There is no height or weight on file to calculate BMI.            Gen: NAD  Head/Eyes/Ears/Nose: MMM, good dentition   Neck: No carotid bruits, no JVD  Lung: Clear to auscultation bilaterally, no wheezes/rales/ronchi, symmetrical lung expansion with inspiration  Heart: Normal S1/S2, regular rate and rhythm, no murmurs/rubs/gallops  Abdomen: Soft, NT/ND, no masses  Extremities: No lower extremity edema.  No wounds or other skin lesions  Skin: Normal color and turgor. No wounds rashes, no petechia, no ecchymoses.   Neuro: AAOx3    DATA:     Laboratory:  CBC:  Recent Labs   Lab 06/03/25  1313   WBC 6.97   HGB 14.3   HCT 45.7   Platelet Count 352       CHEMISTRIES:  Recent Labs   Lab 06/03/25  1313   Glucose 82   Sodium 143   Potassium 4.0   BUN 12   Creatinine 0.8   eGFR >60   Calcium 8.9       CARDIAC BIOMARKERS:        HBA1C:  No results found for: "HGBA1C"     COAGS:        LIPIDS/LFTS:  Recent Labs   Lab 06/03/25  1313   AST 20   ALT 35         CARDIAC DIAGNOSTICS:  :     EKG:  3 Imelda 2025  Sinus rhythm, left atrial enlargement without any ST-T wave change    ECHO  NA    3.  STRESS TEST  NA    4.  CARDIAC CATHETERIZATION  NA    5.  IMAGING   NA    6. OTHERS  ASCVD 3.2% (LDL 85 in Oct 2024)    ASSESSMENT:   General cardiology well-being  Hypertension  Family history of premature CAD    Doing well from cardiac perspective.  PCP is outside of network.  Lipid profile reviewed.  ASCVD score 3.2%.    Asymptomatic.  No need for stress test.    PLAN:   Transthoracic " echocardiogram to look for any structural abnormality  Continue amlodipine 5 mg daily and losartan 100 mg daily  Routine maintenance labs including A1c and lipid profile per PCP  Mediterranean diet and daily light exercise      Follow up in 6 months      Rodrigo Barfield MD  [facility]    This note was created by combination of typed  and M-Modal dictation.   Transcription errors may be present.            [1]   Social History  Socioeconomic History    Marital status:    Occupational History     Employer: [employer]   Tobacco Use    Smoking status: Former     Types: Cigarettes     Start date: 11/5/2009    Smokeless tobacco: Never   Substance and Sexual Activity    Alcohol use: Yes     Comment: occ    Drug use: Yes     Types: Marijuana     Comment: occ     Social Drivers of Health     Financial Resource Strain: Low Risk  (5/29/2025)    Overall Financial Resource Strain (CARDIA)     Difficulty of Paying Living Expenses: Not very hard   Food Insecurity: Food Insecurity Present (5/29/2025)    Hunger Vital Sign     Worried About Running Out of Food in the Last Year: Sometimes true     Ran Out of Food in the Last Year: Sometimes true   Transportation Needs: No Transportation Needs (5/29/2025)    PRAPARE - Transportation     Lack of Transportation (Medical): No     Lack of Transportation (Non-Medical): No   Physical Activity: Insufficiently Active (5/29/2025)    Exercise Vital Sign     Days of Exercise per Week: 3 days     Minutes of Exercise per Session: 10 min   Stress: Stress Concern Present (5/29/2025)    [employer] [place] [employer] - Occupational Stress Questionnaire     Feeling of Stress : To some extent   Housing Stability: High Risk (5/29/2025)    Housing Stability Vital Sign     Unable to Pay for Housing in the Last Year: Yes     Number of Times Moved in the Last Year: 1     Homeless in the Last Year: No

## 2025-06-25 NOTE — TELEPHONE ENCOUNTER
Copied from CRM #[id number]. Topic: Appointments - Appointment Cancellation  >> Jun 25, 2025  9:46 AM  wrote:  Type:  Cancel Appt    Who Called: Leticia  Would the patient rather a call back or a response via [facility]? Call  Best Call Back Number: [phone]  Additional Information: Patient is requesting to cancel his post op

## 2025-06-26 ENCOUNTER — OFFICE VISIT (OUTPATIENT)
Dept: CARDIOLOGY | Facility: CLINIC | Age: 45
End: 2025-06-26
Payer: COMMERCIAL

## 2025-06-26 VITALS
RESPIRATION RATE: 18 BRPM | HEART RATE: 93 BPM | HEIGHT: 72 IN | DIASTOLIC BLOOD PRESSURE: 88 MMHG | WEIGHT: 218.69 LBS | OXYGEN SATURATION: 97 % | SYSTOLIC BLOOD PRESSURE: 128 MMHG | BODY MASS INDEX: 29.62 KG/M2

## 2025-06-26 DIAGNOSIS — I10 HYPERTENSION, UNSPECIFIED TYPE: Primary | ICD-10-CM

## 2025-06-26 PROCEDURE — 3008F BODY MASS INDEX DOCD: CPT | Mod: CPTII,S$GLB,, | Performed by: INTERNAL MEDICINE

## 2025-06-26 PROCEDURE — 3074F SYST BP LT 130 MM HG: CPT | Mod: CPTII,S$GLB,, | Performed by: INTERNAL MEDICINE

## 2025-06-26 PROCEDURE — 4010F ACE/ARB THERAPY RXD/TAKEN: CPT | Mod: CPTII,S$GLB,, | Performed by: INTERNAL MEDICINE

## 2025-06-26 PROCEDURE — 1159F MED LIST DOCD IN RCRD: CPT | Mod: CPTII,S$GLB,, | Performed by: INTERNAL MEDICINE

## 2025-06-26 PROCEDURE — 99204 OFFICE O/P NEW MOD 45 MIN: CPT | Mod: S$GLB,,, | Performed by: INTERNAL MEDICINE

## 2025-06-26 PROCEDURE — 99999 PR PBB SHADOW E&M-EST. PATIENT-LVL V: CPT | Mod: PBBFAC,,, | Performed by: INTERNAL MEDICINE

## 2025-06-26 PROCEDURE — 3079F DIAST BP 80-89 MM HG: CPT | Mod: CPTII,S$GLB,, | Performed by: INTERNAL MEDICINE

## 2025-07-10 ENCOUNTER — OFFICE VISIT (OUTPATIENT)
Dept: FAMILY MEDICINE | Facility: CLINIC | Age: 45
End: 2025-07-10
Payer: COMMERCIAL

## 2025-07-10 VITALS
SYSTOLIC BLOOD PRESSURE: 130 MMHG | HEART RATE: 86 BPM | TEMPERATURE: 98 F | WEIGHT: 217.81 LBS | HEIGHT: 72 IN | OXYGEN SATURATION: 98 % | BODY MASS INDEX: 29.5 KG/M2 | DIASTOLIC BLOOD PRESSURE: 88 MMHG

## 2025-07-10 DIAGNOSIS — Z12.5 SCREENING FOR PROSTATE CANCER: ICD-10-CM

## 2025-07-10 DIAGNOSIS — S46.009S ROTATOR CUFF INJURY, UNSPECIFIED LATERALITY, SEQUELA: ICD-10-CM

## 2025-07-10 DIAGNOSIS — Z00.00 ANNUAL PHYSICAL EXAM: Primary | ICD-10-CM

## 2025-07-10 DIAGNOSIS — Z11.3 SCREENING EXAMINATION FOR STD (SEXUALLY TRANSMITTED DISEASE): ICD-10-CM

## 2025-07-10 DIAGNOSIS — M65.90 TENOSYNOVITIS: ICD-10-CM

## 2025-07-10 PROCEDURE — 3079F DIAST BP 80-89 MM HG: CPT | Mod: CPTII,S$GLB,, | Performed by: FAMILY MEDICINE

## 2025-07-10 PROCEDURE — 3075F SYST BP GE 130 - 139MM HG: CPT | Mod: CPTII,S$GLB,, | Performed by: FAMILY MEDICINE

## 2025-07-10 PROCEDURE — 1159F MED LIST DOCD IN RCRD: CPT | Mod: CPTII,S$GLB,, | Performed by: FAMILY MEDICINE

## 2025-07-10 PROCEDURE — 99999 PR PBB SHADOW E&M-EST. PATIENT-LVL V: CPT | Mod: PBBFAC,,, | Performed by: FAMILY MEDICINE

## 2025-07-10 PROCEDURE — 99386 PREV VISIT NEW AGE 40-64: CPT | Mod: S$GLB,,, | Performed by: FAMILY MEDICINE

## 2025-07-10 PROCEDURE — 4010F ACE/ARB THERAPY RXD/TAKEN: CPT | Mod: CPTII,S$GLB,, | Performed by: FAMILY MEDICINE

## 2025-07-10 PROCEDURE — 1160F RVW MEDS BY RX/DR IN RCRD: CPT | Mod: CPTII,S$GLB,, | Performed by: FAMILY MEDICINE

## 2025-07-10 PROCEDURE — 3008F BODY MASS INDEX DOCD: CPT | Mod: CPTII,S$GLB,, | Performed by: FAMILY MEDICINE

## 2025-07-10 RX ORDER — LOSARTAN POTASSIUM AND HYDROCHLOROTHIAZIDE 12.5; 1 MG/1; MG/1
TABLET ORAL
COMMUNITY

## 2025-07-10 RX ORDER — PROPRANOLOL HYDROCHLORIDE 10 MG/1
10 TABLET ORAL 3 TIMES DAILY
COMMUNITY

## 2025-07-10 NOTE — PROGRESS NOTES
Subjective     Patient ID: Leticia Cade is a 44 y.o. male.    Chief Complaint: Establish Care (Pt requesting blood work and std panel )    44 year old male presents to establish care.     He had his colonsocopy  at [facility] in [place].         History of Present Illness             Past Medical History:   Diagnosis Date    ADHD     Allergy     Depression     Generalized anxiety disorder     Hypertension     Sleep apnea       Past Surgical History:   Procedure Laterality Date    KNEE ARTHROSCOPY W/ ACL RECONSTRUCTION      left    LARYNGOSCOPY, DIRECT, WITH BIOPSY IF INDICATED N/A 6/17/2025    Procedure: LARYNGOSCOPY, DIRECT, WITH BIOPSY IF INDICATED;  Surgeon: Geovanna Alonso MD;  Location: [facility] OR;  Service: ENT;  Laterality: N/A;  RN PREOP 6/3/2025--LATEX ALLERGY    MOUTH SURGERY       Family History   Problem Relation Name Age of Onset    Cancer Mother          breast    Cancer Father          colon    Stroke Brother      Hypertension Brother      Alzheimer's disease Maternal Grandmother       Social History[1]    Review of Systems   Constitutional:  Negative for fatigue.   HENT:  Negative for hearing loss, rhinorrhea, sneezing and sore throat.    Eyes:  Negative for visual disturbance.   Respiratory:  Negative for cough, chest tightness, shortness of breath and wheezing.    Cardiovascular:  Negative for chest pain, palpitations and leg swelling.   Gastrointestinal:  Negative for abdominal pain, constipation, diarrhea, nausea and vomiting.   Endocrine: Negative for polydipsia, polyphagia and polyuria.   Genitourinary:  Negative for dysuria, frequency, hematuria and urgency.   Musculoskeletal:  Negative for arthralgias and back pain.   Integumentary:  Negative for rash.   Neurological:  Negative for dizziness, syncope, light-headedness and headaches.            Objective     Vitals:    07/10/25 1407   BP: 130/88   Pulse: 86   Temp: 97.7 °F (36.5 °C)   SpO2: 98%   Weight: 98.8 kg (217  lb 13 oz)   Height: 6' (1.829 m)        Physical Exam  Constitutional:       General: He is not in acute distress.     Appearance: Normal appearance. He is well-developed. He is not ill-appearing, toxic-appearing or diaphoretic.   HENT:      Head: Normocephalic.      Right Ear: External ear normal.      Left Ear: External ear normal.      Mouth/Throat:      Pharynx: No oropharyngeal exudate.   Eyes:      Conjunctiva/sclera: Conjunctivae normal.   Neck:      Thyroid: No thyromegaly.   Cardiovascular:      Rate and Rhythm: Normal rate and regular rhythm.      Heart sounds: Normal heart sounds. No murmur heard.     No friction rub. No gallop.   Pulmonary:      Effort: Pulmonary effort is normal. No respiratory distress.      Breath sounds: Normal breath sounds. No stridor. No wheezing, rhonchi or rales.   Abdominal:      General: Bowel sounds are normal. There is no distension.      Palpations: Abdomen is soft. There is no mass.      Tenderness: There is no abdominal tenderness. There is no guarding or rebound.      Hernia: No hernia is present.   Musculoskeletal:      Cervical back: Normal range of motion and neck supple.   Lymphadenopathy:      Cervical: No cervical adenopathy.   Skin:     Findings: No rash.   Neurological:      Mental Status: He is alert.   Psychiatric:         Mood and Affect: Mood normal.         Behavior: Behavior normal.       Physical Exam                Assessment and Plan     1. Annual physical exam  -     Hemoglobin A1C; Future; Expected date: 07/10/2025  -     TSH; Future; Expected date: 07/10/2025  -     Lipid Panel; Future; Expected date: 01/10/2026  -     Comprehensive Metabolic Panel; Future; Expected date: 07/10/2025  -     CBC Auto Differential; Future; Expected date: 07/10/2025    2. Screening examination for STD (sexually transmitted disease)  -     Hepatitis Panel, Acute; Future; Expected date: 07/10/2025  -     Treponema Pallidium Antibodies IgG, IgM; Future; Expected date:  "07/10/2025  -     HIV 1/2 Ag/Ab (4th Gen); Future; Expected date: 07/10/2025  -     Urinalysis Microscopic; Future; Expected date: 07/10/2025  -     C. trachomatis/N. gonorrhoeae by AMP DNA; Future; Expected date: 07/10/2025  -     HSV 1 & 2, IgG; Future; Expected date: 07/10/2025    3. Rotator cuff injury, unspecified laterality, sequela  -     Ambulatory referral/consult to Orthopedics; Future; Expected date: 07/17/2025    4. Tenosynovitis  -     Ambulatory referral/consult to Orthopedics; Future; Expected date: 07/17/2025    5. Screening for prostate cancer  -     PSA, Screening; Future; Expected date: 07/10/2025      Leticia Good" was seen today for establish care.    Diagnoses and all orders for this visit:    Annual physical exam  -     Hemoglobin A1C; Future  -     TSH; Future  -     Lipid Panel; Future  -     Comprehensive Metabolic Panel; Future  -     CBC Auto Differential; Future    Screening examination for STD (sexually transmitted disease)  -     Hepatitis Panel, Acute; Future  -     Treponema Pallidium Antibodies IgG, IgM; Future  -     HIV 1/2 Ag/Ab (4th Gen); Future  -     Urinalysis Microscopic; Future  -     C. trachomatis/N. gonorrhoeae by AMP DNA; Future  -     HSV 1 & 2, IgG; Future    Rotator cuff injury, unspecified laterality, sequela  -     Ambulatory referral/consult to Orthopedics; Future    Tenosynovitis  -     Ambulatory referral/consult to Orthopedics; Future    Screening for prostate cancer  -     PSA, Screening; Future        Assessment & Plan                      No follow-ups on file.        This note was generated with the assistance of ambient listening technology. Verbal consent was obtained by the patient and accompanying visitor(s) for the recording of patient appointment to facilitate this note. I attest to having reviewed and edited the generated note for accuracy, though some syntax or spelling errors may persist. Please contact the author of this note for any " clarification.         [1]   Social History  Socioeconomic History    Marital status:    Occupational History     Employer: [employer]   Tobacco Use    Smoking status: Never    Smokeless tobacco: Never   Substance and Sexual Activity    Alcohol use: Yes     Comment: occ    Drug use: Yes     Types: Marijuana     Comment: occ    Sexual activity: Yes     Social Drivers of Health     Financial Resource Strain: Low Risk  (5/29/2025)    Overall Financial Resource Strain (CARDIA)     Difficulty of Paying Living Expenses: Not very hard   Food Insecurity: Food Insecurity Present (5/29/2025)    Hunger Vital Sign     Worried About Running Out of Food in the Last Year: Sometimes true     Ran Out of Food in the Last Year: Sometimes true   Transportation Needs: No Transportation Needs (5/29/2025)    PRAPARE - Transportation     Lack of Transportation (Medical): No     Lack of Transportation (Non-Medical): No   Physical Activity: Insufficiently Active (5/29/2025)    Exercise Vital Sign     Days of Exercise per Week: 3 days     Minutes of Exercise per Session: 10 min   Stress: Stress Concern Present (5/29/2025)    [employer] [place] [employer] - Occupational Stress Questionnaire     Feeling of Stress : To some extent   Housing Stability: High Risk (5/29/2025)    Housing Stability Vital Sign     Unable to Pay for Housing in the Last Year: Yes     Number of Times Moved in the Last Year: 1     Homeless in the Last Year: No

## 2025-07-11 ENCOUNTER — LAB VISIT (OUTPATIENT)
Dept: LAB | Facility: HOSPITAL | Age: 45
End: 2025-07-11
Attending: FAMILY MEDICINE
Payer: COMMERCIAL

## 2025-07-11 DIAGNOSIS — Z11.3 SCREENING EXAMINATION FOR STD (SEXUALLY TRANSMITTED DISEASE): ICD-10-CM

## 2025-07-11 DIAGNOSIS — Z12.5 SCREENING FOR PROSTATE CANCER: ICD-10-CM

## 2025-07-11 DIAGNOSIS — Z00.00 ANNUAL PHYSICAL EXAM: ICD-10-CM

## 2025-07-11 LAB
ABSOLUTE EOSINOPHIL (OHS): 0.03 K/UL
ABSOLUTE MONOCYTE (OHS): 0.38 K/UL (ref 0.3–1)
ABSOLUTE NEUTROPHIL COUNT (OHS): 6.68 K/UL (ref 1.8–7.7)
ALBUMIN SERPL BCP-MCNC: 4 G/DL (ref 3.5–5.2)
ALP SERPL-CCNC: 131 UNIT/L (ref 40–150)
ALT SERPL W/O P-5'-P-CCNC: 29 UNIT/L (ref 10–44)
ANION GAP (OHS): 8 MMOL/L (ref 8–16)
AST SERPL-CCNC: 16 UNIT/L (ref 11–45)
BASOPHILS # BLD AUTO: 0.04 K/UL
BASOPHILS NFR BLD AUTO: 0.4 %
BILIRUB SERPL-MCNC: 0.7 MG/DL (ref 0.1–1)
BUN SERPL-MCNC: 11 MG/DL (ref 6–20)
CALCIUM SERPL-MCNC: 9.5 MG/DL (ref 8.7–10.5)
CHLORIDE SERPL-SCNC: 104 MMOL/L (ref 95–110)
CHOLEST SERPL-MCNC: 211 MG/DL (ref 120–199)
CHOLEST/HDLC SERPL: 4.1 {RATIO} (ref 2–5)
CO2 SERPL-SCNC: 28 MMOL/L (ref 23–29)
CREAT SERPL-MCNC: 1 MG/DL (ref 0.5–1.4)
EAG (OHS): 111 MG/DL (ref 68–131)
ERYTHROCYTE [DISTWIDTH] IN BLOOD BY AUTOMATED COUNT: 14.6 % (ref 11.5–14.5)
GFR SERPLBLD CREATININE-BSD FMLA CKD-EPI: >60 ML/MIN/1.73/M2
GLUCOSE SERPL-MCNC: 96 MG/DL (ref 70–110)
HAV IGM SERPL QL IA: NORMAL
HBA1C MFR BLD: 5.5 % (ref 4–5.6)
HBV CORE IGM SERPL QL IA: NORMAL
HBV SURFACE AG SERPL QL IA: NORMAL
HCT VFR BLD AUTO: 45.7 % (ref 40–54)
HCV AB SERPL QL IA: NORMAL
HDLC SERPL-MCNC: 52 MG/DL (ref 40–75)
HDLC SERPL: 24.6 % (ref 20–50)
HGB BLD-MCNC: 14.4 GM/DL (ref 14–18)
HIV 1+2 AB+HIV1 P24 AG SERPL QL IA: NORMAL
IMM GRANULOCYTES # BLD AUTO: 0.03 K/UL (ref 0–0.04)
IMM GRANULOCYTES NFR BLD AUTO: 0.3 % (ref 0–0.5)
LDLC SERPL CALC-MCNC: 140.8 MG/DL (ref 63–159)
LYMPHOCYTES # BLD AUTO: 2.22 K/UL (ref 1–4.8)
MCH RBC QN AUTO: 26.4 PG (ref 27–31)
MCHC RBC AUTO-ENTMCNC: 31.5 G/DL (ref 32–36)
MCV RBC AUTO: 84 FL (ref 82–98)
MICROSCOPIC COMMENT: NORMAL
NONHDLC SERPL-MCNC: 159 MG/DL
NUCLEATED RBC (/100WBC) (OHS): 0 /100 WBC
PLATELET # BLD AUTO: 298 K/UL (ref 150–450)
PMV BLD AUTO: 9.8 FL (ref 9.2–12.9)
POTASSIUM SERPL-SCNC: 4 MMOL/L (ref 3.5–5.1)
PROT SERPL-MCNC: 7.4 GM/DL (ref 6–8.4)
PSA SERPL-MCNC: 1.12 NG/ML
RBC # BLD AUTO: 5.45 M/UL (ref 4.6–6.2)
RBC #/AREA URNS AUTO: 2 /HPF (ref 0–4)
RELATIVE EOSINOPHIL (OHS): 0.3 %
RELATIVE LYMPHOCYTE (OHS): 23.7 % (ref 18–48)
RELATIVE MONOCYTE (OHS): 4.1 % (ref 4–15)
RELATIVE NEUTROPHIL (OHS): 71.2 % (ref 38–73)
SODIUM SERPL-SCNC: 140 MMOL/L (ref 136–145)
SQUAMOUS #/AREA URNS AUTO: <1 /HPF
T PALLIDUM IGG+IGM SER QL: NORMAL
TRIGL SERPL-MCNC: 91 MG/DL (ref 30–150)
TSH SERPL-ACNC: 1.8 UIU/ML (ref 0.4–4)
WBC # BLD AUTO: 9.38 K/UL (ref 3.9–12.7)
WBC #/AREA URNS AUTO: <1 /HPF (ref 0–5)

## 2025-07-11 PROCEDURE — 86593 SYPHILIS TEST NON-TREP QUANT: CPT

## 2025-07-11 PROCEDURE — 36415 COLL VENOUS BLD VENIPUNCTURE: CPT | Mod: PO

## 2025-07-11 PROCEDURE — 84153 ASSAY OF PSA TOTAL: CPT

## 2025-07-11 PROCEDURE — 81001 URINALYSIS AUTO W/SCOPE: CPT

## 2025-07-11 PROCEDURE — 87389 HIV-1 AG W/HIV-1&-2 AB AG IA: CPT

## 2025-07-11 PROCEDURE — 83036 HEMOGLOBIN GLYCOSYLATED A1C: CPT

## 2025-07-11 PROCEDURE — 87491 CHLMYD TRACH DNA AMP PROBE: CPT

## 2025-07-11 PROCEDURE — 85025 COMPLETE CBC W/AUTO DIFF WBC: CPT

## 2025-07-11 PROCEDURE — 80061 LIPID PANEL: CPT

## 2025-07-11 PROCEDURE — 86696 HERPES SIMPLEX TYPE 2 TEST: CPT

## 2025-07-11 PROCEDURE — 80074 ACUTE HEPATITIS PANEL: CPT

## 2025-07-11 PROCEDURE — 80053 COMPREHEN METABOLIC PANEL: CPT

## 2025-07-11 PROCEDURE — 84443 ASSAY THYROID STIM HORMONE: CPT

## 2025-07-14 ENCOUNTER — HOSPITAL ENCOUNTER (OUTPATIENT)
Dept: RADIOLOGY | Facility: HOSPITAL | Age: 45
Discharge: HOME | End: 2025-07-14
Attending: PHYSICIAN ASSISTANT
Payer: COMMERCIAL

## 2025-07-14 ENCOUNTER — OFFICE VISIT (OUTPATIENT)
Dept: ORTHOPEDICS | Facility: CLINIC | Age: 45
End: 2025-07-14
Payer: COMMERCIAL

## 2025-07-14 VITALS — WEIGHT: 217.81 LBS | HEIGHT: 72 IN | BODY MASS INDEX: 29.5 KG/M2

## 2025-07-14 DIAGNOSIS — M25.511 CHRONIC RIGHT SHOULDER PAIN: ICD-10-CM

## 2025-07-14 DIAGNOSIS — S46.009S ROTATOR CUFF INJURY, UNSPECIFIED LATERALITY, SEQUELA: ICD-10-CM

## 2025-07-14 DIAGNOSIS — M65.90 TENOSYNOVITIS: ICD-10-CM

## 2025-07-14 DIAGNOSIS — G89.29 CHRONIC RIGHT SHOULDER PAIN: ICD-10-CM

## 2025-07-14 DIAGNOSIS — M67.911 TENDINOPATHY OF ROTATOR CUFF, RIGHT: Primary | ICD-10-CM

## 2025-07-14 LAB
HSV1 IGG SERPL QL IA: NEGATIVE
HSV2 IGG SERPL QL IA: POSITIVE

## 2025-07-14 PROCEDURE — 4010F ACE/ARB THERAPY RXD/TAKEN: CPT | Mod: CPTII,S$GLB,, | Performed by: PHYSICIAN ASSISTANT

## 2025-07-14 PROCEDURE — 3044F HG A1C LEVEL LT 7.0%: CPT | Mod: CPTII,S$GLB,, | Performed by: PHYSICIAN ASSISTANT

## 2025-07-14 PROCEDURE — 3008F BODY MASS INDEX DOCD: CPT | Mod: CPTII,S$GLB,, | Performed by: PHYSICIAN ASSISTANT

## 2025-07-14 PROCEDURE — 99203 OFFICE O/P NEW LOW 30 MIN: CPT | Mod: S$GLB,,, | Performed by: PHYSICIAN ASSISTANT

## 2025-07-14 PROCEDURE — 99999 PR PBB SHADOW E&M-EST. PATIENT-LVL IV: CPT | Mod: PBBFAC,,, | Performed by: PHYSICIAN ASSISTANT

## 2025-07-14 PROCEDURE — 1159F MED LIST DOCD IN RCRD: CPT | Mod: CPTII,S$GLB,, | Performed by: PHYSICIAN ASSISTANT

## 2025-07-14 PROCEDURE — 73030 X-RAY EXAM OF SHOULDER: CPT | Mod: TC,50

## 2025-07-14 PROCEDURE — 1160F RVW MEDS BY RX/DR IN RCRD: CPT | Mod: CPTII,S$GLB,, | Performed by: PHYSICIAN ASSISTANT

## 2025-07-14 RX ORDER — MELOXICAM 15 MG/1
15 TABLET ORAL DAILY
Qty: 30 TABLET | Refills: 2 | Status: SHIPPED | OUTPATIENT
Start: 2025-07-14

## 2025-07-14 NOTE — PROGRESS NOTES
[facility]  Orthopedic Surgery  Clinic Note      Subjective:   History of Present Illness    CHIEF COMPLAINT:  Patient presents today for bilateral shoulder pain, right more than left.    RIGHT SHOULDER PAIN:  He reports right shoulder pain for 2.5-3 years following a pull-up exercise injury. MRI revealed a torn labrum, RTC, and bicep tendon issues. He describes progressive strength loss in the right shoulder. Pain is activity-dependent with minimal to no pain at rest. He completed 6-8 weeks of physical therapy without perceived benefit. Surgery was recommended but has not been pursued because he move out of [place]. The condition remains stable, neither improving nor worsening.    LEFT SHOULDER PAIN:  He reports left shoulder pain for approximately 1-1.5 months following a basketball injury. Pain is located in the forearm, bicep, and top of the shoulder.      MEDICATIONS:  He previously took Mobic, which was most effective for managing inflammation, and Tylenol 3. He is currently not taking any pain medications.    SOCIAL HISTORY:  He works as a manager in a desk job, primarily seated at a computer.      ROS:  Musculoskeletal: -joint pain, +muscle pain, +arm pain on exertion, +limb pain, +neck stiffness, +muscle weakness        Medications: I have reviewed medication list in the chart at the time of this encounter.     Review of patient's allergies indicates:   Allergen Reactions    Latex      Other Reaction(s): Unknown    Latex, natural rubber Hives    Nickel sutures [surgical stainless steel] Hives      Past Medical History:   Diagnosis Date    ADHD     Allergy     Depression     Generalized anxiety disorder     Hypertension     Sleep apnea      Past Surgical History:   Procedure Laterality Date    KNEE ARTHROSCOPY W/ ACL RECONSTRUCTION      left    LARYNGOSCOPY, DIRECT, WITH BIOPSY IF INDICATED N/A 6/17/2025    Procedure: LARYNGOSCOPY, DIRECT, WITH BIOPSY IF INDICATED;  Surgeon: Geovanna Alonso MD;   Location: [facility] OR;  Service: ENT;  Laterality: N/A;  RN PREOP 6/3/2025--LATEX ALLERGY    MOUTH SURGERY         Objective:     Physical Exam  Musculoskeletal:      Right shoulder: No swelling, deformity, effusion or bony tenderness. Decreased strength.      Left shoulder: No swelling, deformity, effusion or bony tenderness. Normal range of motion. Normal strength.          Right Shoulder Exam     Muscle Strength   Abduction: 4/5   Internal rotation: 4/5   External rotation: 4/5   Supraspinatus: 4/5   Subscapularis: 4/5   Biceps: 4/5       Left Shoulder Exam     Muscle Strength   Abduction: 4/5   Internal rotation: 5/5   External rotation: 5/5   Supraspinatus: 5/5   Subscapularis: 5/5   Biceps: 5/5             Right shoulder:  180/180° flexion.   180/180° abduction.   130/140° adduction.   -60/-60° extension.     Positive Painful arc test from °.   Negative Neer's impingement sign.   Negative Hawkin's test.   Negative Drop arm test.  Negative Rio's supraspinatus test (empty can) test.   Negative Hornblower's (TM weakness) sign.  Negative Belly press test.   Positive Lift off test.   Negative internal rotation lag sign.   Positive Abduction/external rotation test.  Negative external rotation lag sign.   Positive Speed's test.   Negative O'calin (active compression) test.   Negative Jerk test.   Negative Ramakrishna Sign.  Negative Crank test.   Negative Anterior apprehension/instability test.   Negative Posterior apprehension/instability test.   Negative Sulcus (inferior instability) test/sign.    Left shoulder:  180/180° flexion.   180/180° abduction.   140/140° adduction.   -60/-60° extension.     Positive Painful arc test from °.   Negative Neer's impingement sign.   Negative Hawkin's test.   Negative Drop arm test.  Negative Rio's supraspinatus test (empty can) test.   Negative Hornblower's (TM weakness) sign.  Negative Belly press test.   Negative Lift off test.   Negative internal rotation lag sign.    Negative Abduction/external rotation test.  Negative external rotation lag sign.   Negative Speed's test.   Negative O'calin (active compression) test.   Negative Jerk test.   Negative Ramakrishna Sign.  Negative Crank test.   Negative Anterior apprehension/instability test.   Negative Posterior apprehension/instability test.   Negative Sulcus (inferior instability) test/sign.        Imaging:  I have independently interpreted and reviewed XR shoulder obtianed today in clinic with patient. He is unable to find MRI results from previous outside Ortho evaluation.    Assessment:       1. Tendinopathy of rotator cuff, right    2. Tenosynovitis    3. Chronic right shoulder pain       Plan:       Orders Placed This Encounter    X-Ray Shoulder Trauma 3 View Bilateral    MRI Shoulder Without Contrast Right    meloxicam (MOBIC) 15 MG tablet        Assessment & Plan    IMPRESSION:  - Patient with history of self reported labrum, RTC pathology that required surgery several years ago, but he moved from [place] and has been loss in follow up.  - Assessed current symptoms in both shoulders, noting new left shoulder pain onset about 1-1.5 months ago.  - Reviewed recent XR, which showed normal bone structure and no signs of arthritis.  - Considered previous unsuccessful physical therapy for right shoulder.  - Will get new MRI and refer to Sports Medicine.     PATIENT EDUCATION:  - Explained that XR only show bone structures and cannot visualize soft tissue injuries.    MEDICATIONS:  - Started Mobic (meloxicam) as needed for pain and inflammation. Take after eating. Do not take every day unless needed. Avoid taking additional Advil or Motrin while using Mobic.    ORDERS:  - Ordered MRI right shoulder..    REFERRALS:  - Referred to sports medicine surgeon after MRI results are available.    FOLLOW UP:  - Follow up after MRI is completed.         Future Appointments   Date Time Provider Department Center   7/21/2025  3:30 PM [facility] MRI2 [id number]  LB LIMIT [facility] MRI [facility]   7/25/2025 12:30 PM ECHO, [facility] [place] EKG [facility]   7/28/2025  1:30 PM Sebastian Kyle MD [facility] SPORTS [place]   9/22/2025  9:45 AM Geovanna Alonso MD [facility] ENT [facility]       Fatimah Hernandez PA-C  Orthopedic Surgery  [facility]

## 2025-07-15 ENCOUNTER — PATIENT MESSAGE (OUTPATIENT)
Dept: FAMILY MEDICINE | Facility: CLINIC | Age: 45
End: 2025-07-15
Payer: COMMERCIAL

## 2025-07-15 LAB
C TRACH DNA SPEC QL NAA+PROBE: NOT DETECTED
CTGC SOURCE (OHS) ORD-325: NORMAL
N GONORRHOEA DNA UR QL NAA+PROBE: NOT DETECTED

## 2025-08-15 ENCOUNTER — PATIENT MESSAGE (OUTPATIENT)
Dept: ADMINISTRATIVE | Facility: HOSPITAL | Age: 45
End: 2025-08-15
Payer: COMMERCIAL

## 2025-08-17 DIAGNOSIS — Z12.11 SCREENING FOR COLON CANCER: ICD-10-CM

## (undated) DEVICE — GLOVE SIGNATURE ESSNTL LTX 6.5

## (undated) DEVICE — SUPPORT ULNA NERVE PROTECTOR

## (undated) DEVICE — SYR 10CC LUER LOCK

## (undated) DEVICE — SOL NACL STRL BOTTLE 1000ML

## (undated) DEVICE — DEVICE MUCOSOL ATOMIZATION

## (undated) DEVICE — TUBING SUC UNIV W/CONN 12FT

## (undated) DEVICE — NDL HYPO STD REG BVL 18GX1.5IN

## (undated) DEVICE — DRAPE THREE-QUARTER 53X77IN

## (undated) DEVICE — POSITIONER HEAD DONUT 9IN FOAM

## (undated) DEVICE — TOWEL OR DISP STRL BLUE 4/PK

## (undated) DEVICE — DRESSING TELFA N ADH 3X8

## (undated) DEVICE — SET BLD COL SAFETY 23G 3/4 LL

## (undated) DEVICE — KIT ANTIFOG

## (undated) DEVICE — CONTAINER SPECIMEN OR STER 4OZ

## (undated) DEVICE — BLANKET WARMING LOWER BODY

## (undated) DEVICE — GOWN NONREINF SET-IN SLV XL

## (undated) DEVICE — KIT CUSTOM MINOR PROC ST

## (undated) DEVICE — NDL HYPO REG 25G X 1 1/2

## (undated) DEVICE — SPONGE PATTY SURGICAL .5X3IN